# Patient Record
Sex: FEMALE | Race: WHITE
[De-identification: names, ages, dates, MRNs, and addresses within clinical notes are randomized per-mention and may not be internally consistent; named-entity substitution may affect disease eponyms.]

---

## 2017-09-06 ENCOUNTER — HOSPITAL ENCOUNTER (EMERGENCY)
Dept: HOSPITAL 25 - UCCORT | Age: 27
Discharge: HOME | End: 2017-09-06
Payer: COMMERCIAL

## 2017-09-06 VITALS — DIASTOLIC BLOOD PRESSURE: 78 MMHG | SYSTOLIC BLOOD PRESSURE: 138 MMHG

## 2017-09-06 DIAGNOSIS — J45.909: ICD-10-CM

## 2017-09-06 DIAGNOSIS — J06.9: Primary | ICD-10-CM

## 2017-09-06 DIAGNOSIS — Z88.0: ICD-10-CM

## 2017-09-06 PROCEDURE — G0463 HOSPITAL OUTPT CLINIC VISIT: HCPCS

## 2017-09-06 PROCEDURE — 99212 OFFICE O/P EST SF 10 MIN: CPT

## 2017-09-06 NOTE — UC
Respiratory Complaint HPI





- HPI Summary


HPI Summary: 





25 y/o female presents to the urgent care c/o productive cough since Vince 9/3/

2017. Pt states she is producing a mild white phlegm. She felt mild SOB and 

started using her Albuterol inhaler. She has also taking [id number] PO for 

her body aches, which has resolved today. Pt denies fever, wheezing, chest pain

, HA, nasal congestion, N/V/D.








- History of Current Complaint


Chief Complaint: UCRespiratory


Stated Complaint: COUGH/FEVER


Time Seen by Provider: 09/06/17 15:58


Hx Obtained From: Patient


Hx Last Menstrual Period: 8/28/17


Pregnant?: No


Onset/Duration: Gradual Onset, Lasting Days, Still Present


Timing: Constant


Severity Initially: Mild


Severity Currently: Moderate


Pain Intensity: 0


Pain Scale Used: 0-10 Numeric


Character: Cough: Productive - white phlegm


Aggravating Factors: Nothing


Alleviating Factors: Bronchodilator


Associated Signs And Symptoms: Positive: Dyspnea, Nasal Congestion.  Negative: 

Fever, Wheezing





- Risk Factors


Pulmonary Embolism Risk Factors: Negative


Cardiac Risk Factors: Negative


Pseudomonas Risk Factors: Negative


Tuberculosis Risk Factors: Negative





- Allergies/Home Medications


Allergies/Adverse Reactions: 


 Allergies











Allergy/AdvReac Type Severity Reaction Status Date / Time


 


Erythromycin Allergy Intermediate Hives Verified 09/06/17 15:47


 


Amoxicillin Allergy  Rash Verified 09/06/17 15:47


 


pecans Allergy  Swelling Uncoded 09/06/17 15:47





   Of  





   Face,Lips,&  





   Throat  














PMH/Surg Hx/FS Hx/Imm Hx


Previously Healthy: Yes


Respiratory History: Asthma





- Surgical History


Surgical History: Yes


Surgery Procedure, Year, and Place: C- section 5/1/16; wisdom teeth





- Family History


Known Family History: Positive: Cardiac Disease - BROTHER WPW, FATHER CARDIAC 

CONCERNS, Hypertension, Diabetes





- Social History


Occupation: Employed Full-time


Lives: With Family


Alcohol Use: None


Substance Use Type: None


Smoking Status (MU): Never Smoked Tobacco





Review of Systems


Constitutional: Negative, Other - body aches


Skin: Negative


Eyes: Negative


ENT: Nasal Discharge, Sinus Congestion


Respiratory: Shortness Of Breath - mild


Cardiovascular: Negative


Gastrointestinal: Negative


Genitourinary: Negative


Motor: Negative


Neurovascular: Negative


Musculoskeletal: Negative


Neurological: Negative


Psychological: Negative


Is Patient Immunocompromised?: No


All Other Systems Reviewed And Are Negative: Yes





Physical Exam


Triage Information Reviewed: Yes


Appearance: Well-Appearing, No Pain Distress, Well-Nourished, Obese


Vital Signs: 


 Initial Vital Signs











Temp  98.2 F   09/06/17 15:43


 


Pulse  65   09/06/17 15:43


 


Resp  16   09/06/17 15:43


 


BP  138/78   09/06/17 15:43


 


Pulse Ox  99   09/06/17 15:43











Vital Signs Reviewed: Yes


Eye Exam: Normal


Eyes: Positive: Conjunctiva Clear - PERRLA, EOMI


ENT: Positive: Normal ENT inspection, Hearing grossly normal, Pharyngeal 

erythema, Nasal congestion - edematous, erythematous nasal mucosa, Nasal 

drainage - clear.  Negative: Tonsillar swelling, Tonsillar exudate


Dental Exam: Normal


Neck exam: Normal


Neck: Positive: Supple, Nontender, No Lymphadenopathy


Respiratory Exam: Normal


Respiratory: Positive: Chest non-tender, Lungs clear, Normal breath sounds, No 

respiratory distress


Cardiovascular Exam: Normal


Cardiovascular: Positive: RRR, No Murmur, Pulses Normal


Abdominal Exam: Normal


Abdomen Description: Positive: Nontender, No Organomegaly, Soft.  Negative: CVA 

Tenderness (R), CVA Tenderness (L)


Bowel Sounds: Positive: Present


Musculoskeletal Exam: Normal


Musculoskeletal: Positive: Strength Intact, ROM Intact, No Edema


Neurological Exam: Normal


Psychological Exam: Normal


Skin Exam: Normal





UC Diagnostic Evaluation





- Laboratory


O2 Sat by Pulse Oximetry: 99





Respiratory Course/Dx





- Course


Course Of Treatment: 25 y/o female presents to the urgent care c/o productive 

cough since Vince 9/3/2017. Pt states she is producing a mild white phlegm. 

She felt mild SOB and started using her Albuterol inhaler. She has also taking 

[id number] PO for her body aches, which has resolved today. Pt denies fever

, wheezing, chest pain, HA, nasal congestion, N/V/D.Hx obtained, PE performed. 

Pt with the URI. Pt Tessalon PO to alleviate cough, advised to use the 

albuterol nebulizer at home if she continues with SOB. Advised to increase 

fluid intake, rest and eat well. If not improvement of symptoms to return to 

the urgent care or f/u with PCP for further management.





- Differential Dx/Diagnosis


Differential Diagnosis/HQI/PQRI: Asthma, Bronchitis, Influenza, Laryngitis, 

Lower Resp Infection, Sinusitis


Provider Diagnoses: 1- Upper respiratory infection





Discharge





- Discharge Plan


Condition: Stable


Disposition: HOME


Prescriptions: 


Albuterol 2.5MG/3ML (0.083%)* [Ventolin 2.5 MG/3 ML NEB.SOL*] 2.5 mg INH Q6H #1 

box


Benzonatate CAP* [Tessalon 100 MG CAP*] 100 mg PO TID #15 cap


Patient Education Materials:  Cold Symptoms (ED)


Referrals: 


CRISTÓBAL Araujo [Primary Care Provider] - 1 Week


Additional Instructions: 


1-Please continue taking ibuprofen PO q6-8hrs prn as instructed after meals to 

alleviate symptoms.


2- Take Tessalon Tabs PO to alleviate cough, increase fluid intake, rest ant 

est well. Use the Nebulizer in case you start feeling SOB.


3-If symptoms do not improve or worsen please return to the urgent care or f/u 

with your PCP for further evaluation and treatment.

## 2017-10-17 ENCOUNTER — HOSPITAL ENCOUNTER (EMERGENCY)
Dept: HOSPITAL 25 - UCCORT | Age: 27
Discharge: HOME | End: 2017-10-17
Payer: COMMERCIAL

## 2017-10-17 VITALS — SYSTOLIC BLOOD PRESSURE: 115 MMHG | DIASTOLIC BLOOD PRESSURE: 63 MMHG

## 2017-10-17 DIAGNOSIS — Z32.02: ICD-10-CM

## 2017-10-17 DIAGNOSIS — R14.0: ICD-10-CM

## 2017-10-17 DIAGNOSIS — R10.13: Primary | ICD-10-CM

## 2017-10-17 DIAGNOSIS — Z88.1: ICD-10-CM

## 2017-10-17 DIAGNOSIS — Z91.018: ICD-10-CM

## 2017-10-17 PROCEDURE — 84702 CHORIONIC GONADOTROPIN TEST: CPT

## 2017-10-17 PROCEDURE — G0463 HOSPITAL OUTPT CLINIC VISIT: HCPCS

## 2017-10-17 PROCEDURE — 81003 URINALYSIS AUTO W/O SCOPE: CPT

## 2017-10-17 PROCEDURE — 99212 OFFICE O/P EST SF 10 MIN: CPT

## 2017-10-17 NOTE — UC
Complaint Female HPI





- HPI Summary


HPI Summary: 





27 y/o female presents to the urgent care c/o feeling bloated and gassy with 

ache pain around his upper abdomen that radiates to his lower back for the past 

week. Pain is dull. 6/10. She also states frequency and pressure with 

urination. She has had nausea at times, espically when she is very bloated, but 

denies vomit, diarrhea or constipation. She had a normal BM this morning and 

she had a good breakfast this morning. She took Ibuprofen 200mg PO last night 

which relieve her symptoms. Pt denies fever, chest pain, SOB, burning on 

urination, vaginal discharge, Hx of STDs. LMP: 9/29/2017








- History Of Current Complaint


Chief Complaint: UCGeneralIllness


Stated Complaint: URINARY


Time Seen by Provider: 10/17/17 14:02


Hx Obtained From: Patient


Hx Last Menstrual Period: 9/29/17


Pregnant?: No


Onset/Duration: Gradual Onset, Lasting Weeks - 1 week, Still Present


Timing: Intermittent, Lasting Minutes


Severity Initially: Mild


Severity Currently: Moderate


Pain Intensity: 6


Pain Scale Used: 0-10 Numeric


Radiates to: lower back 


Character: Dull


Aggravating Factor(s): Urination


Alleviating Factor(s): Meds


Associated Signs And Symptoms: Positive: Back Pain, Nausea.  Negative: Fever, 

Vomiting(# Of Episodes =), Genital Swelling, Genital Blisters





- Risk Factors


Ectopic Pregnancy Risk Factor: Negative


Ovarian Torsion Risk Factor: Negative





- Allergies/Home Medications


Allergies/Adverse Reactions: 


 Allergies











Allergy/AdvReac Type Severity Reaction Status Date / Time


 


Erythromycin Allergy Intermediate Hives Verified 10/17/17 14:04


 


Amoxicillin Allergy  Rash Verified 10/17/17 14:04


 


pecans Allergy  Swelling Uncoded 10/17/17 14:04





   Of  





   Face,Lips,&  





   Throat  














PMH/Surg Hx/FS Hx/Imm Hx


Previously Healthy: Yes


Respiratory History: Asthma





- Surgical History


Surgical History: Yes


Surgery Procedure, Year, and Place: C- section 5/1/16; wisdom teeth





- Family History


Known Family History: Positive: Cardiac Disease - BROTHER WPW, FATHER CARDIAC 

CONCERNS, Hypertension, Diabetes, Other - NO fmh of mastitis





- Social History


Occupation: Employed Full-time


Lives: With Family


Alcohol Use: Occasionally


Substance Use Type: None


Smoking Status (MU): Never Smoked Tobacco





Review of Systems


Constitutional: Negative


Skin: Negative


Eyes: Negative


ENT: Negative


Respiratory: Negative


Cardiovascular: Negative


Gastrointestinal: Abdominal Pain - ache, dull , bloaded abdomen around upper 

abdomen with burping


Genitourinary: Frequency - and bladder pressure


Motor: Negative


Neurovascular: Negative


Musculoskeletal: Negative


Neurological: Negative


Psychological: Negative


Is Patient Immunocompromised?: No


All Other Systems Reviewed And Are Negative: Yes





Physical Exam


Triage Information Reviewed: Yes


Vital Signs: 


 Initial Vital Signs











Temp  98.9 F   10/17/17 14:06


 


Pulse  89   10/17/17 14:06


 


Resp  18   10/17/17 14:06


 


BP  115/63   10/17/17 14:06


 


Pulse Ox  99   10/17/17 14:06














- Additional Comments


Vital Signs Reviewed: Yes


General: well developed, well nourished female sitting in the examining table w/

o any apparent distress


Eyes: Positive: Conjunctiva Clear - PERRLA, EOMI, fundi grossly normal


ENT: Positive: Normal ENT inspection, Hearing grossly normal, Pharynx normal, 

TMs normal


Neck: Positive: Supple, Nontender, No Lymphadenopathy


Respiratory: Positive: Chest non-tender, Lungs clear, Normal breath sounds, No 

respiratory distress


Cardiovascular: Positive: RRR, No Murmur, Pulses Normal, Brisk Capillary Refill


Abdomen Description:  abdomen Flat with no distention. No surface trauma, scars

, incisions.  hyperactive bowel sounds  present in all four quadrants. Soft and

  non tenderness to palpation,  no guarding, no rigidity to palpation. No 

masses palpated, no pulsation in epigastric area.  No organomegaly.  Negative 

[place] signs.  No periumbilical tenderness.  No rebound in the lower 

quadrants.  NT over [place] point.  no suprapubic tenderness with  no 

distension.  Good femoral pulses bilaterally.  No hernia noted. No CVAT 

bilaterally


Bowel Sounds: Positive: Present, Hyperactive


Musculoskeletal: Positive: Strength Intact, ROM Intact, No Edema


Neurological Exam: Normal


Psychological Exam: Normal


Skin Exam: Normal











 Complaint Female Dx





- Course


Course Of Treatment: 27 y/o female presents to the urgent care c/o feeling 

bloated and gassy with ache pain around his upper abdomen that radiates to his 

lower back for the past week. Pain is dull. 6/10. She also states frequency and 

pressure with urination. She has had nausea at times, especially when she is 

very bloated, but denies vomit, diarrhea or constipation. She had a normal BM 

this morning and she had a good breakfast this morning. She took Ibuprofen 

200mg PO last night which relieve her symptoms. Pt denies fever, chest pain, SOB

, burning on urination, vaginal discharge, Hx of STDs. LMP: 9/29/2017. Hx 

obtained, UA ordered, Result: negative. PE : WNL. Pt retaining a lot of gas in 

the abdomen. Pt Rx Ibuprofen PO and Omeprazole PO. Advised on dietary 

modifications. Pt explained D/C intrsuctions. Pt understood and agreed with 

plan of care.





- Differential Dx/Diagnosis


Differential Diagnosis/HQI/PQRI: Cervicitis, Renal Colic, Ureteral Stone, 

Urinary Tract Infection


Provider Diagnoses: 1-Epigastric abdominl pain.  2-Gas and bloating





Discharge





- Discharge Plan


Condition: Stable


Disposition: HOME


Prescriptions: 


Ibuprofen TAB* [Motrin TAB* 800 MG] 800 mg PO Q6H #20 tab


Omeprazole CAP* [Prilosec CAP* 20 MG] 20 mg PO DAILY #30 cap.


Patient Education Materials:  Gas and Bloating (ED), Abdominal Pain (ED)


Referrals: 


CRISTÓBAL Araujo [Primary Care Provider] - 3 Days


Additional Instructions: 


1- Please take medications as directed to alleviate symptoms. Take Ibuprofen PO 

after meals. Drink hot water or hot tea to relieve gas.  Limiting dietary 

ingestion of known gas-producing foods such as cabbage, onions, broccoli, 

brussel sprouts, wheat, and potatoes can help reduce symptoms. Also avoid 

drinking carbonated beverages, and gulping food or liquids


2 - If symptoms do not improve or worsen in the next 2 days please go 

immediately to the ER or your PCP for further evaluation and treatment.

## 2018-06-05 ENCOUNTER — HOSPITAL ENCOUNTER (EMERGENCY)
Dept: HOSPITAL 25 - UCCORT | Age: 28
Discharge: HOME | End: 2018-06-05
Payer: COMMERCIAL

## 2018-06-05 VITALS — DIASTOLIC BLOOD PRESSURE: 68 MMHG | SYSTOLIC BLOOD PRESSURE: 134 MMHG

## 2018-06-05 DIAGNOSIS — Z88.1: ICD-10-CM

## 2018-06-05 DIAGNOSIS — J32.9: Primary | ICD-10-CM

## 2018-06-05 PROCEDURE — G0463 HOSPITAL OUTPT CLINIC VISIT: HCPCS

## 2018-06-05 PROCEDURE — 99212 OFFICE O/P EST SF 10 MIN: CPT

## 2018-06-05 NOTE — UC
Throat Pain/Nasal Kadeem HPI





- HPI Summary


HPI Summary: 





Pt c/o sore throat and generalized malaise X 2 weeks.  Pt states she was seen 

by PCP 1 week ago and had rapid strep test- negative, now states throat has 

improved but sinus pressure and pain has worsened. 





- History of Current Complaint


Chief Complaint: UCGeneralIllness


Stated Complaint: SORE THROAT, CONGESTION


Time Seen by Provider: 06/05/18 16:26


Hx Obtained From: Patient


Hx Last Menstrual Period: 5/7/18


Pregnant?: No


Onset/Duration: Gradual Onset, Lasting Weeks, Still Present, Worse Since - onset


Severity: Moderate


Pain Intensity: 6


Associated Signs & Symptoms: Positive: Dysphagia, Sinus Discomfort


Related History: Seasonal Allergies





- Epiglottits Risk Factors


Epiglottis Risk Factors: Negative





- Allergies/Home Medications


Allergies/Adverse Reactions: 


 Allergies











Allergy/AdvReac Type Severity Reaction Status Date / Time


 


amoxicillin Allergy  Rash Verified 06/05/18 16:17


 


erythromycin base Allergy  Hives Verified 06/05/18 16:17


 


pecans Allergy  Swelling Uncoded 06/05/18 16:17





   Of  





   Face,Lips,&  





   Throat  











Home Medications: 


 Home Medications





Dm/Pseudoephed/Acetaminophen [Day-Time Cold-Flu Softgel] 1 each PO Q6HR PRN 06/ 05/18 [History Confirmed 06/05/18]











PMH/Surg Hx/FS Hx/Imm Hx


Previously Healthy: Yes





- Surgical History


Surgical History: Yes


Surgery Procedure, Year, and Place: C- section 5/1/16; wisdom teeth





- Family History


Known Family History: Positive: Cardiac Disease - BROTHER WPW, FATHER CARDIAC 

CONCERNS, Hypertension, Diabetes, Other - NO fmh of mastitis





- Social History


Occupation: Employed Full-time


Lives: With Family


Alcohol Use: Occasionally


Substance Use Type: None


Smoking Status (MU): Never Smoked Tobacco


Have You Smoked in the Last Year: No





Review of Systems


Constitutional: Fatigue


Skin: Negative


Eyes: Negative


ENT: Sore Throat, Sinus Congestion, Sinus Pain/Tenderness


Respiratory: Negative


Cardiovascular: Negative


Gastrointestinal: Negative


Genitourinary: Negative


Motor: Negative


Neurovascular: Negative


Musculoskeletal: Negative


Neurological: Headache


Psychological: Negative


Is Patient Immunocompromised?: No


All Other Systems Reviewed And Are Negative: Yes





Physical Exam


Triage Information Reviewed: Yes


Appearance: Ill-Appearing


Vital Signs: 


 Initial Vital Signs











Temp  98.9 F   06/05/18 16:12


 


Pulse  86   06/05/18 16:12


 


Resp  16   06/05/18 16:12


 


BP  134/68   06/05/18 16:12


 


Pulse Ox  99   06/05/18 16:12











Vital Signs Reviewed: Yes


Eye Exam: Normal


ENT Exam: Other


ENT: Positive: Tonsillar swelling, Sinus tenderness


Dental Exam: Normal


Neck exam: Normal


Respiratory Exam: Normal


Cardiovascular Exam: Normal


Musculoskeletal Exam: Normal


Neurological Exam: Normal


Psychological Exam: Normal


Skin Exam: Normal





Throat Pain/Nasal Course/Dx





- Differential Dx/Diagnosis


Differential Diagnosis/HQI/PQRI: Pharyngitis, Sinusitis, Tonsillitis


Provider Diagnoses: sinusitis





Discharge





- Sign-Out/Discharge


Documenting (check all that apply): Discharge/Admit/Transfer





- Discharge Plan


Condition: Stable


Disposition: HOME


Prescriptions: 


DOXYcycline CAP(*) [DOXYcycline 100MG CAP(*)] 100 mg PO Q12H #20 cap


Patient Education Materials:  Sinusitis (ED), Tonsillitis (ED)


Referrals: 


CRISTÓBAL Araujo [Primary Care Provider] - 


Additional Instructions: 


Please follow up with your PCP or return to clinic as needed. 





- Billing Disposition and Condition


Condition: STABLE


Disposition: Home

## 2019-04-17 ENCOUNTER — HOSPITAL ENCOUNTER (INPATIENT)
Dept: HOSPITAL 25 - MCHOB | Age: 29
LOS: 3 days | Discharge: HOME | DRG: 540 | End: 2019-04-20
Attending: OBSTETRICS & GYNECOLOGY | Admitting: OBSTETRICS & GYNECOLOGY
Payer: COMMERCIAL

## 2019-04-17 DIAGNOSIS — Z3A.39: ICD-10-CM

## 2019-04-17 DIAGNOSIS — O34.211: Primary | ICD-10-CM

## 2019-04-17 DIAGNOSIS — Z88.1: ICD-10-CM

## 2019-04-17 DIAGNOSIS — Z30.2: ICD-10-CM

## 2019-04-17 PROCEDURE — 85025 COMPLETE CBC W/AUTO DIFF WBC: CPT

## 2019-04-17 PROCEDURE — 36415 COLL VENOUS BLD VENIPUNCTURE: CPT

## 2019-04-17 PROCEDURE — 0UB70ZZ EXCISION OF BILATERAL FALLOPIAN TUBES, OPEN APPROACH: ICD-10-PCS | Performed by: OBSTETRICS & GYNECOLOGY

## 2019-04-17 PROCEDURE — 4A1HXCZ MONITORING OF PRODUCTS OF CONCEPTION, CARDIAC RATE, EXTERNAL APPROACH: ICD-10-PCS | Performed by: OBSTETRICS & GYNECOLOGY

## 2019-04-17 PROCEDURE — 88302 TISSUE EXAM BY PATHOLOGIST: CPT

## 2019-04-17 RX ADMIN — ONDANSETRON PRN MG: 2 INJECTION INTRAMUSCULAR; INTRAVENOUS at 20:13

## 2019-04-17 RX ADMIN — ONDANSETRON PRN MG: 2 INJECTION INTRAMUSCULAR; INTRAVENOUS at 14:05

## 2019-04-17 RX ADMIN — KETOROLAC TROMETHAMINE SCH MG: 30 INJECTION, SOLUTION INTRAMUSCULAR; INTRAVENOUS at 18:24

## 2019-04-17 NOTE — XMS REPORT
Continuity of Care Document (CCD)

 Created on:2019



Patient:Megan Polk

Sex:Female

:1990

External Reference #:[id number]





Demographics







 Address  [address] [place] [address]

 

 Home Phone  [phone]

 

 Mobile Phone  [phone]

 

 Email Address  [email or web address]

 

 Preferred Language  en

 

 Marital Status  Not  or 

 

 Voodoo Affiliation  Unknown

 

 Race  White

 

 Ethnic Group  Not  or 









Author







 Name  Susana Roman









Support







 Name  Relationship  Address  Phone

 

 Yvon Hernandez    Unavailable  [phone]









Care Team Providers







 Name  Role  Phone

 

 Sumaya Hedrick MD  Primary Care Physician  Unavailable









Payers







 Date  Identification Numbers  Payment Provider  Subscriber

 

   Policy Number: [id number]  [facility]  Megan Hernandez









 PayID: [id number]  [address] [address]









 [place] [address]









   Policy Number: [id number]  [facility]  Megan Hernandez









 PayID: [id number]  [address]









 [place] [address]







Advance Directives







 Description

 

 No Information Available







Problems







 Description

 

 No Information







Family History







 Date  Family Member(s)  Observation  Comments

 

   Father   due to Unknown Causes  ()

 

   Mother  A&W  

 

   Children  3  

 

   First Son  A&W  

 

   First Daughter  A&W  

 

   Second Daughter  A&W  

 

   Siblings  2  

 

   First Brother  A&W  

 

   First Brother  born with heart problems  

 

   First Brother  born with white castano parkinsons  

 

   First Sister  A&W  

 

   Paternal Grandfather   due to heart problems  ()

 

   Paternal Grandmother  Skin Cancer  

 

   Maternal Grandfather  A&W  

 

   Maternal Grandmother  A&W  







Social History







 Type  Date  Description  Comments

 

 Birth Sex    Unknown  

 

 Marital Status      

 

 Lives With      

 

 Lives With    Daughters  

 

 Lives With    Son  

 

 Tobacco Use  Start: Unknown  Never Smoked Cigarettes  

 

 ETOH Use    Occasionally consumes alcohol  

 

 Recreational Drug Use    Denies Drug Use  

 

 Tobacco Use  Start: Unknown  Patient has never smoked  

 

 Smoking Status  Reviewed: 19  Patient has never smoked  

 

 Currently Active    Patient is currently sexually  



     active  

 

 STD's    No STD History  







Allergies, Adverse Reactions, Alerts







 Date  Description  Reaction  Status  Severity  Comments

 

 2018  Erythromycin  Hives  Active  Severe  

 

 2018  Amoxicillin  Rash  Active  Moderate  







Medications







 Medication  Date  Status  Form  Strength  Qnty  SIG  Indications  Ordering



                 Provider

 

 Advocate  03/20/  Active  Misc  33G X 4 mm  100uni  Use with    Lizz Eri



 Insulin Pen          ts  KwikPen    Mark



 Needles                , CNM

 

 Humulin N  /  Active  Supn  100Unit/ML  3ml  inject 2    Phaelon



 Kwikpen  2019          units SQ at    MD Marely



             bedtime,    



             increase as    



             directed    

 

 Blood Glucose  /  Active  Kit  W/Device  1Monit  use in in    Gasper A.



 Monitoring          or  the morning    Gelber,



 System            before    M.D.



             eating, then    



             2 hours    



             after every    



             meal    

 

 Blood Glucose  /  Active  Strips    100uni  use as    Gasper CARTER.



 Test Strips          ts  directed    Gelber,



 Premium                M.D.

 

 Lancets  /  Active  Misc    100uni  for use with    Gasper A.



   2019        ts  blood    Gelber,



             glucose    M.D.



             monitor. use    



             as directed    



             four times a    



             day    

 

 Ferrous  /  Active  Tablets  324(38Fe)  60tabs  1 by mouth    Gasper VALENZUELA



 Gluconate  2019      mg    every day    DARLIN Vargas

 

 Diclegis  10/01/  Active  Tablets DR  10-10mg  100tab  take 2 tabs    Mahrie



   2018        s  qhs. if    Appiah,



             symptoms    CNM



             persist can    



             add 1 tab    



             qam . if    



             symptoms    



             persist, add    



             1 tab every    



             afternoon.    



             MDD 4 tabs    

 

 Ondansetron  10/01/  Active  Tablets  4mg  30tabs  1-2 tablets    Erianna



 HCL  2018          every 6    Velazquez,



             hours as    CNM



             needed for    



             nausea    

 

 Flintstones  /  Active  Chewtabs  60mg    take 2 by    Unknown



 Complete  0000          mouth daily    

 

 Epipen 2-Nikos  /  Active  Solution  0.3mg/0.3M    bring to    Unknown



   0000    [employer]  L    office as    



       t      directed    

 

 Benadryl  /  Active  Tablets  25mg    prn    Unknown



 Allergy  0000              

 

                 

 

 Metformin HCL  /  Hx  Tablets  500mg  90tabs  take 1    Gasper ACalvin



    -          tablet once    Gelber,



   /          daily for 7    M.D.



             days, then    



             take 1    



             tablet 2    



             times a day    



             for 7 days,    



             then take 1    



             tablet 3    



             times a day.    

 

 Tamiflu  /  Hx  Capsules  75mg  10caps  1 by mouth    Mahrie



    -          daily x 10    Appiah,



   02/15/          days for flu    CNM



   2019          prophylaxis    

 

 Reglan  /  Hx  Tablets  5mg  60tabs  1-2 tabs by    Erianna



   2018 -          mouth every    Velazquez,



   /          6 hours as    CNM



   2019          needed    



             nausea    

 

 Unisom  /  Hx  Tablets  25mg        Unknown



   0000 -              



   2019              

 

 Vitamin B 6  /  Hx  Tablets  50mg        Unknown



    -              



   2019              







Immunizations







 Description

 

 No Information Available







Vital Signs







 Date  Vital  Result  Comment

 

 10/11/2018  9:27am  BP Systolic  130 mmHg  









 BP Diastolic  68 mmHg  

 

 Height  63 inches  5'3"

 

 Weight  194.00 lb  

 

 BMI (Body Mass Index)  34.4 kg/m2  

 

 Last Menstrual Period  [id number]  

 

   4  

 

 Parity  3  







Results







 Test  Date  Facility  Test  Result  H/L  Range  Note

 

 Laboratory test    [facility]  Genital For GRP  SEE RESULT
      1



 finding  [address]  [place] [address]  B Strep Only  BELOW      



     [phone]          

 

 Glucose Tolerance    [facility]  GTT 3HR  (SEE NOTE)      2



 3HR Gestational  [address]  [place] [address]  Gestational        



     [phone]          

 

 Laboratory test    [facility]  Glucose 1 HR  183 mg/dL  
High    3



 finding  [address]  [place] [address]  Post Prandial        



     [phone]          

 

 CBC With No Diff    [facility]  White Blood  10.2  N  3.5-
10.8  



   [address]  [place] [address]  Count  10^3/uL      



     [phone]          









 Red Blood Count  4.09 10^6/uL  N  4.00-5.40  

 

 Hemoglobin  10.7 g/dL  Low  12.0-16.0  

 

 Hematocrit  34 %  Low  35-47  

 

 Mean Corpuscular Volume  83 fL  N  80-97  

 

 Mean Corpuscular Hemoglobin  26 pg  Low  27-31  

 

 Mean Corpuscular HGB Conc  32 g/dL  N  31-36  

 

 Red Cell Distribution Width  15 %  N  10.5-15  

 

 Platelet Count  315 10^3/uL  N  150-450  

 

 Mean Platelet Volume  7.7 fL  N  7.4-10.4  









 Laboratory test  2019  [facility]  Rubella Screen  Equivocal 
   Immune  4



 [place] [address]          



     [phone]          

 

 Urine Drug Comp  2018  [facility]  Urine  Negative      5



 20 [place] [address]  Amphetamine  ng/mL      



     [phone]          









 Urine Barbiturates  Negative ng/mL      6

 

 Urine Benzodiazepines  Negative ng/mL      7

 

 Urine Cocaine  Negative ng/mL      8

 

 Urine Phencyclidine  Negative ng/mL    Cutoff: 25  

 

 Urine Tetrahydrocannabinol  Negative ng/mL    Cutoff: 50  9

 

 Creatinine, Urine  141.5 mg/dL      

 

 Specific Gravity  1.014      

 

 pH  7.1      

 

 Oxidants  Negative      10

 

 Adulterants Comment  Normal      

 

 Codeine, Ur  Not Detected ng/mL    Cutoff: 25  11

 

 Codeine-6-beta-glucuronide, Ur  Not Detected ng/mL      12

 

 Morphine, Ur  Not Detected ng/mL    Cutoff: 25  13

 

 Morphine-6-beta-glucuronide, U  Not Detected ng/mL      14

 

 6-monoacetylmorphine, Ur  Not Detected ng/mL    Cutoff: 25  15

 

 Hydrocodone, Ur  Not Detected ng/mL    Cutoff: 25  16

 

 Norhydrocodone, Ur  Not Detected ng/mL    Cutoff: 25  17

 

 Dihydrocodeine, Ur  Not Detected ng/mL    Cutoff: 25  18

 

 Hydromorphone, Ur  Not Detected ng/mL    Cutoff: 25  19

 

 Ljazlczcfctol6lluzmfdkpfnvkqj  Not Detected ng/mL      20

 

 Oxycodone, Ur  Not Detected ng/mL    Cutoff: 25  21

 

 Noroxycodone, Ur  Not Detected ng/mL    Cutoff: 25  22

 

 Oxymorphone, Ur  Not Detected ng/mL    Cutoff: 25  23

 

 Oxymorphone-3-beta-glucuronide  Not Detected ng/mL      24

 

 Noroxymorphone, Ur  Not Detected ng/mL    Cutoff: 25  25

 

 Fentanyl, Ur  Not Detected ng/mL    Cutoff: 2  26

 

 Norfentanyl, Ur  Not Detected ng/mL    Cutoff: 2  27

 

 Meperidine, Ur  Not Detected ng/mL    Cutoff: 25  28

 

 Normeperidine, Ur  Not Detected ng/mL    Cutoff: 25  29

 

 Naloxone, Ur  Not Detected ng/mL    Cutoff: 25  30

 

 Naloxone-3-beta-glucuronide, U  Not Detected ng/mL      31

 

 Methadone, Ur  Not Detected ng/mL    Cutoff: 25  32

 

 Eddp, Ur  Not Detected ng/mL    Cutoff: 25  33

 

 Propoxyphene, Ur  Not Detected ng/mL    Cutoff: 25  34

 

 Norpropoxyphene, Ur  Not Detected ng/mL    Cutoff: 25  35

 

 Tramadol, Ur  Not Detected ng/mL    Cutoff: 25  36

 

 O-desmethyltramadol, Ur  Not Detected ng/mL    Cutoff: 25  37

 

 Tapentadol, Ur  Not Detected ng/mL    Cutoff: 25  38

 

 N-desmethyltapentadol, Ur  Not Detected ng/mL    Cutoff: 50  39

 

 Tapentadol-beta-glucuronide, U  Not Detected ng/mL      40

 

 Buprenorphine, Ur  Not Detected ng/mL    Cutoff: 5  41

 

 Norbuprenorphine, Ur  Not Detected ng/mL    Cutoff: 5  42

 

 Norbuprenorphine glucuronide  Not Detected ng/mL    Cutoff: 20  43

 

 Opioid Interpretation  See Comment      44









 GC/Chlamydia Dna  2018  [facility]  Chlamydia  Negative    
Negative  



 [place] [address]  trachomatis Rna        



     [phone]          









 Neisseria gonorrhoeae (GC) Rna  Negative    Negative  









 Urine Culture And  10/11/2018  [facility]  Urine Culture  SEE 
RESULT      45



 [place] [address]    BELOW      



     [phone]          

 

 Toxoplasma Igg &  10/11/2018  [facility]  Toxoplasma IgG  Negative 
   Negative  



 [place] [address]  Antibody        



     [phone]          









 Toxoplasma IgG Antibody Index  <3 IU/mL      46

 

 Toxoplasma IgM Antibody  Negative    Negative  47









 Parvovirus B19  10/11/2018  [facility]  Parvovirus  Positive  
Abnormal  Negative  



 [place]gg [place] [address]  (B19) IgG        



     [phone]  Antibody        









 Parvovirus (B19) IgM Antibody  Negative    Negative  

 

 Parvovirus Interpretation  See Comment      48









 Lead  10/11/2018  [facility]  Lead,Venous, B  < 1.0 g/dL    0.0-
4.9  49



     [place], [place] [address] [phone]          









 Venous/Capillary  Venous      

 

 Submitting Laboratory Phone  [phone]      50









 HIV 1/2 AB  10/11/2018  [facility]  HIV 1 2  Nonreactive    
Nonreactive  51



 [place] [address]  Antibody        



     [phone]          

 

 Type And  10/11/2018  [facility]  Patient  A Positive      



 [place] [address]  Blood Type        



     [phone]          









 Antibody Screen  NEGATIVE      









 CBC With No  10/11/2018  [facility]  White Blood  7.6 10^3/uL  N  
3.5-10.8  



 [place] [address]  Count        



     [phone]          









 Red Blood Count  4.37 10^6/uL  N  4.00-5.40  

 

 Hemoglobin  12.8 g/dL  N  12.0-16.0  

 

 Hematocrit  38 %  N  35-47  

 

 Mean Corpuscular Volume  88 fL  N  80-97  

 

 Mean Corpuscular Hemoglobin  29 pg  N  27-31  

 

 Mean Corpuscular HGB Conc  33 g/dL  N  31-36  

 

 Red Cell Distribution Width  14 %  N  10.5-15  

 

 Platelet Count  281 10^3/uL  N  150-450  

 

 Mean Platelet Volume  7.7 um3  N  7.4-10.4  









 Prenatal PNL No  10/11/2018  [facility]  Rubella Screen  Equivocal 
   Immune  52



 [place] [address]          



     [phone]          









 Hemoglobin A1c  5.3 %  N  4.0-5.6  53

 

 Hepatitis B Surface Ag  Nonreactive    Nonreactive  54

 

 Syphillis Igg W/Reflex RPR  Nonreactive    Nonreactive  55









 Laboratory test  10/11/2018  [facility]  Cytology  SEE RESULT BELOW
      56



 [place] [address]          



     [phone]          









 1  SEE RESULT BELOW



   -----------------------------------------------------------------------------
---------------



   Name:  MEGAN POLK           : 1990    Attend Dr: Latoya Harris MD



   Acct:  [id number]  Unit: [id number]  AGE: 28            Location:  [facility]



   Re19                        SEX: F             Status:    REG REF



   -----------------------------------------------------------------------------
---------------



   



   SPEC: [id number]         EDNA:       SUBM DR: Latoya Harris MD



   REQ:  [id number]              RECD:   



   STATUS: COMP



   _



   SOURCE: CER/VAG/RE     SPDESC:



   ORDERED:  Grp B Strp Scrn, GBS Sensi



   COMMENTS: Pt with allergies to Amoxicillin and Erythromycin



   [id number]



   QUERIES:  Is Patient Penicillin Allergic? Y



   Is patient penicillin allergic and/or sensitivities needed? Y



   Provider Requisition # [id number]



   



   -----------------------------------------------------------------------------
---------------



   Procedure                         Result                         Reported   
        Site



   -----------------------------------------------------------------------------
---------------



   Group B Strep Culture Screen  Final                              846      ML



   



   Group B Strep Screen        Positive



   



   GBS Sensitivity  Final                                           846      ML



   Organism 1                     STREP AGALACTIAE - (GROUP B)



   



   1. STREP AGALACTIAE - (GROUP B)



   M.I.C.      RX



   --------- ------



   Ampicillin                     <=0.25      S



   Penicillin                     <=0.12      S



   Clindamycin                                R



   Levofloxacin                   1           S



   Linezolid                      2           S



   * Moxifloxacin                   0.5         S



   * Quinupristin/Dalfopristin      <=0.25      S



   Tetracycline                   >=16        R



   Tigecycline                    <=0.12      S



   Vancomycin                     <=0.5       S



   Imipenem-Deduced                           S



   * Ampicillin/Sulbactam-Deduced               S



   Cefazolin-Deduced                          S



   



   



   



   



   



   ** CONTINUED ON NEXT PAGE **



   



   DEPARTMENT OF PATHOLOGY,  [address] [place] [address]



   Phone # [phone]      Fax #[phone]



   Conrado Cabrera M.D. Director     [facility] # [id number]



   



   



   



   -----------------------------------------------------------------------------
---------------



   Patient: MEGAN POLK            [id number]     (Continued)



   -----------------------------------------------------------------------------
---------------



   



   



   Specimen: [id number]    Collected:   Received: 19-
    (Continued)



   



   -----------------------------------------------------------------------------
---------------



   Procedure                         Result                         Reported   
        Site



   -----------------------------------------------------------------------------
---------------



   GBS Sensitivity  Final   (continued)



   * These antibiotics are not available in the [facility] Formulary



   



   Contact the Microbiology Department for any additional



   antibiotic reporting.



   



   -----------------------------------------------------------------------------
---------------



   * ML - Main Lab



   .



   



   



   



   



   



   



   



   



   



   



   



   



   



   



   



   



   



   



   



   



   



   



   



   



   



   



   



   



   



   ** END OF REPORT **



   



   DEPARTMENT OF PATHOLOGY,  [address] [place] [address]



   Phone # [phone]      Fax #[phone]



   Conrado Cabrera M.D. Director     [facility] # [id number]

 

 2  GLU Fast 92                   Col: [phone]



   GLU 1HR  201                  Col: 19 1155



   GLU 2HR  181                  Col: 19 [phone]



   GLU 3HR  103                  Col: 19 1355



   GLU Interp                      Col: 19 1055



   GTT normal ranges for obstetrics per the American College of



   Gynecologists (ACOG).Based on 100 gm glucose load:



   Fasting <95 mg/dl



   1hr     <180 mg/dl



   2hr     <155 mg/dl



   3hr     <140 mg/dl

 

 3  [id number]

 

 4  [id number]

 

 5  -------------------REFERENCE VALUE--------------------------



   Cutoff: 500

 

 6  -------------------REFERENCE VALUE--------------------------



   Cutoff: 200

 

 7  -------------------REFERENCE VALUE--------------------------



   Cutoff: 100

 

 8  -------------------REFERENCE VALUE--------------------------



   Cutoff: 150

 

 9  -------------------ADDITIONAL INFORMATION-------------------



   This report is intended for use in clinical monitoring or



   management of patients.  It is not intended for use in



   employment-related testing.

 

 10  -------------------REFERENCE VALUE--------------------------



   Cutoff: 200 mg/L

 

 11  Tylenol 3

 

 12  Metabolite of codeine



   -------------------REFERENCE VALUE--------------------------



   Cutoff: 100

 

 13  Sherlyn Mckeon, MS Contin; Also a minor metabolite (10%) of



   codeine and can be seen in low concentrations (<2,000



   ng/mL) with poppy seed ingestion.

 

 14  Metabolite of morphine



   -------------------REFERENCE VALUE--------------------------



   Cutoff: 100

 

 15  Metabolite of heroin

 

 16  Lortab, Norco, Vicodin; Also a very minor metabolite of



   codeine and impurity (<1%) of oxycodone.

 

 17  Metabolite of hydrocodone

 

 18  Metabolite of hydrocodone

 

 19  Dilaudid, Exalgo; Also a metabolite of hydrocodone and a



   minor (<5%) metabolite of morphine.

 

 20  Metabolite of hydromorphone



   -------------------REFERENCE VALUE--------------------------



   Cutoff: 100

 

 21  Endocet, Percocet, Oxycontin

 

 22  Metabolite of oxycodone

 

 23  Numorphan, Opana; Also a metabolite of oxycodone.

 

 24  Metabolite of oxymorphone



   -------------------REFERENCE VALUE--------------------------



   Cutoff: 100

 

 25  Metabolite of oxymorphone

 

 26  Actiq, Duragesic, Fentora

 

 27  Metabolite of fentanyl

 

 28  Demerol

 

 29  Metabolite of meperidine

 

 30  Narcan

 

 31  Metabolite of naloxone



   -------------------REFERENCE VALUE--------------------------



   Cutoff: 100

 

 32  Dolophine

 

 33  Metabolite of methadone

 

 34  Darvon, Darvocet

 

 35  Metabolite of propoxyphene

 

 36  Tradol, Ultram, Ultracet

 

 37  Metabolite of tramadol

 

 38  Nucynta

 

 39  Metabolite of tapentadol

 

 40  Metabolite of tapentadol



   -------------------REFERENCE VALUE--------------------------



   Cutoff: 100

 

 41  Buprenex, Suboxone

 

 42  Metabolite of buprenorphine

 

 43  Metabolite of buprenorphine

 

 44  No opioids were detected. The absence of expected drug(s)



   and/or drug metabolite(s) may indicate non-compliance,



   altered pharmacokinetics, inappropriate timing of specimen



   collection relative to drug administration,



   diluted/adulterated urine, or limitations of testing.



   -------------------ADDITIONAL INFORMATION-------------------



   This test was developed and its performance characteristics



   determined by [facility] in a manner consistent with CLIA



   requirements. This test has not been cleared or approved by



   the U.S. Food and Drug Administration.



   Test Performed by:



   [facility]



   [address] [place] [address]

 

 45  SEE RESULT BELOW



   -----------------------------------------------------------------------------
---------------



   Name:  MEGAN POLK           : 1990    Attend Dr: Pattie BECKFORD



   Acct:  [id number]  Unit: [id number]  AGE: 27            Location:  [facility]



   Reg:   10/11/18                        SEX: F             Status:    REG REF



   -----------------------------------------------------------------------------
---------------



   



   SPEC: [id number]         EDNA:   10/11/    [facility] DR: Pattie BECKFORD



   REQ:  [id number]              RECD:   10/11/



   STATUS: COMP



   _



   SOURCE: URINE          SPDESC:



   ORDERED:  Urine Culture



   COMMENTS: [id number]



   Urine Source: Random



   



   -----------------------------------------------------------------------------
---------------



   Procedure                         Result                         Reported   
        Site



   -----------------------------------------------------------------------------
---------------



   Urine Culture  Final                                             10/12/18-
1333      ML



   No Growth (<1,000 CFU/mL)



   



   -----------------------------------------------------------------------------
---------------



   * ML - Main Lab



   .



   



   



   



   



   



   



   



   



   



   



   



   



   



   



   



   



   



   



   



   



   



   



   



   



   ** END OF REPORT **



   



   DEPARTMENT OF PATHOLOGY,  [address] [place] [address]



   Phone # [phone]      Fax #[phone]



   Conrado Cabrera M.D. Director     [facility] # [id number]

 

 46  -------------------REFERENCE VALUE--------------------------



   <=9 IU/mL (Negative)



   10-11 IU/mL (Equivocal)



   >=12 IU/mL (Positive)



   Test Performed by:



   [facility] [place] [address]

 

 47  No IgM antibodies to T. gondii detected. Results may be



   negative in patients with recent infection or who are



   significantly immunosuppressed.

 

 48  RESULT: Results suggest past infection.



   -------------------ADDITIONAL INFORMATION-------------------



   This test has been modified from the 's



   instructions. Its performance characteristics were



   determined by [facility] in a manner consistent with



   CLIA requirements. This test has not been cleared or



   approved by the U.S. Food and Drug Administration.



   Test Performed by:



   [facility] [place] [address]

 

 49  -------------------ADDITIONAL INFORMATION-------------------



   Testing performed by Inductively Coupled Plasma-Mass



   Spectrometry (ICP-MS).



   This test was developed and its performance characteristics



   determined by [facility] in a manner consistent with CLIA



   requirements. This test has not been cleared or approved by



   the U.S. Food and Drug Administration.

 

 50  Test Performed by:



   [facility] [place] [address]

 

 51  It is recognized that currently available assays for the



   detection of antibodies to HIV-1 and/or HIV-2 may not detect



   all infected individuals. HIV antibodies may be undetectable



   in some stages of the infection and in some clinical



   conditions. The performance of this assay has not been



   established for populations of infants or children.



   



   Assayed by Chemiluminescence Microparticle Immunoassay on



   the Siemens Advia Centaur CP. Values obtained with different



   methods or kits cannot be used interchangeably.The



   diagnostic specificity of the ADVIA Centaur 1/O/2 Enhanced



   assay in the low risk population was 99.90% (6052/6058) with



   a 95% confidence interval of 99.78 to 99.96%.

 

 52  [id number]

 

 53  Therapeutic target for the treatment of diabetes



   mellitus patients is <7% HBA1C, and in selective



   patients <6.0%.  Please refer to American Diabetes



   Association diabetic care guidelines for further



   information.

 

 54  [id number]

 

 55  Warning:  A positive result is not useful for establishing a



   diagnosis of syphilis.  In most situations, such a result



   may reflect a prior treated infection; a negative result can



   exclude a diagnosis of syphilis except for incubating or



   early primary disease.

 

 56  SEE RESULT BELOW



   -----------------------------------------------------------------------------
---------------



   Name:  JALIL JHARIGHTMEGAN           : 1990    Attend Dr: Pattie BECKFORD



   Acct:  [id number]  Unit: [id number]  AGE: 27            Location:  [facility]



   Reg:   10/11/18                        SEX: F             Status:    REG REF



   -----------------------------------------------------------------------------
---------------



   



   SPEC: [id number]            EDNA: 10/11/      SUBM DR: Pattie BECKFORD



   REQ:  [id number]             RECD: 10/11/



   STATUS: SOUT



   _



   ORDERED:  TP IMAGE ANALYS, HPV/Thin Prep



   COMMENTS: [id number]



   Negative for Intraepithelial lesion or Malignancy



   



   -----------------------------------------------------------------------------
---------------



   Date     Time Test              Result   Flag (u) Normal Range



   10/11/18 0277 @ HPV RNA         Negative          Negative



   @



   @               The high-risk HPV types detected by the assay include: 16,



   @               18, 31, 33, 35, 39, 45, 51, 52, 56, 58, 59, 66, and 68.



   -----------------------------------------------------------------------------
---------------



   



   A. Ectocervical/Endocervical



   Specimen Adequacy:



   Satisfactory of evaluation



   Transformation zone component identified



   Patient Information:



   HPV: High risk HPV RNA testing regardless of pap results.



   Actual Specimen Date:         10/11/18



   Last Menstrual Date:          18



   Spec Date if unknown:       



   Pregnant?:     Y



   



   Signed by and Reported on: __________              NORI Billy(ASCP) 10
/12/18 1517



   



   This Pap test was evaluated with the assistance of the DormNoisep Test Imaging 
System. Due to



   cytologic findings at the imager microscope, comprehensive manual 
rescreening by a



   Cytotechnologist may be required.



   The Pap Smear is a screening test designed to aid in the detection of 
premalignant and



   malignant conditions of the uterine cervix.  It is not a diagnostic 
procedure and should



   not be used as the sole means of detecting cervical cancer.  Both false-
positive and false-



   negative reports do occur.  Depending on your risk status, a Pap smear 
should be obtained



   and evaluated every 1-3 years.



   



   -----------------------------------------------------------------------------
---------------



   



   



   



   



   ** END OF REPORT **



   



   DEPARTMENT OF PATHOLOGY,  [address] [place] [address]



   Phone # [phone]      Fax #[phone]



   Conrado Cabrera M.D. Director     [facility] # [id number]







Procedures







 Date  Code  Description  Status

 

 2019  22337  Echography Pregnant Uterus Limited  Completed

 

 2019  94704  Echography Pregnant Uterus Limited  Completed

 

 2019  87886  Fetal Non-Stress Test  Completed

 

 2019  30853  Biophysical Profile Without Non Stress Test  Completed

 

 2019  39055  Echography Pregnant Uterus Follow-Up Or Repeat  Completed

 

 2019  88150  Fetal Non-Stress Test  Completed

 

 2018  18489  Echography Pregnant Uterus Complete  Completed

 

 10/11/2018  00215  OB Ultrasound First Trimester  Completed







Encounters







 Description

 

 No Information Available







Plan of Treatment

Future Appointment(s):2019  1:00 pm - Latoya Harris MD at [facility] 10:20 am - Barbie Mims CNM at [facility]2019  9:30 am - 
Latoya Harris MD at [facility]

## 2019-04-17 NOTE — XMS REPORT
Continuity of Care Document (CCD)

 Created on:2019



Patient:Megan Polk

Sex:Female

:1990

External Reference #:[id number]





Demographics







 Address  [address] [place] [address]

 

 Home Phone  [phone]

 

 Mobile Phone  [phone]

 

 Email Address  [email or web address]

 

 Preferred Language  en

 

 Marital Status  Not  or 

 

 Quaker Affiliation  Unknown

 

 Race  White

 

 Ethnic Group  Not  or 









Author







 Name  Latoya Harris MD

 

 Address  [address]



   Unavailable



   [place] [address]









Support







 Name  Relationship  Address  Phone

 

 Yvon Hernandez    Unavailable  [phone]









Care Team Providers







 Name  Role  Phone

 

 Sumaya Hedrick MD  Primary Care Physician  Unavailable









Payers







 Date  Identification Numbers  Payment Provider  Subscriber

 

   Policy Number: [id number]  [facility]  Megan Hernandez









 PayID: [id number]  [address] [address]









 [place] [address]









   Policy Number: [id number]  [facility]  Megan Hernandez









 PayID: [id number]  [address]









 [place] [address]







Advance Directives







 Description

 

 No Information Available







Problems







 Description

 

 No Information







Family History







 Date  Family Member(s)  Observation  Comments

 

   Father   due to Unknown Causes  ()

 

   Mother  A&W  

 

   Children  3  

 

   First Son  A&W  

 

   First Daughter  A&W  

 

   Second Daughter  A&W  

 

   Siblings  2  

 

   First Brother  A&W  

 

   First Brother  born with heart problems  

 

   First Brother  born with white castano parkinsons  

 

   First Sister  A&W  

 

   Paternal Grandfather   due to heart problems  ()

 

   Paternal Grandmother  Skin Cancer  

 

   Maternal Grandfather  A&W  

 

   Maternal Grandmother  A&W  







Social History







 Type  Date  Description  Comments

 

 Birth Sex    Unknown  

 

 Marital Status      

 

 Lives With      

 

 Lives With    Daughters  

 

 Lives With    Son  

 

 Tobacco Use  Start: Unknown  Never Smoked Cigarettes  

 

 ETOH Use    Occasionally consumes alcohol  

 

 Recreational Drug Use    Denies Drug Use  

 

 Currently Active    Patient is currently sexually active  

 

 STD's    No STD History  







Allergies, Adverse Reactions, Alerts







 Date  Description  Reaction  Status  Severity  Comments

 

 2018  Erythromycin  Hives  Active  Severe  

 

 2018  Amoxicillin  Rash  Active  Moderate  







Medications







 Medication  Date  Status  Form  Strength  Qnty  SIG  Indications  Ordering



                 Provider

 

 Advocate  /  Active  Misc  33G X 4 mm  100uni  Use with    Lizz Hwang



 Insulin Pen          ts  KwikPen    Mark



 Needles                , CNM

 

 Humulin N  /  Active  Supn  100Unit/ML  3ml  inject 2    Phaelon



 Kwikpen  2019          units SQ at    MD Marely



             bedtime,    



             increase as    



             directed    

 

 Blood Glucose  /  Active  Kit  W/Device  1Monit  use in in    Gasper ACalvin



 Monitoring          or  the morning    Gelber,



 System            before    M.D.



             eating, then    



             2 hours    



             after every    



             meal    

 

 Blood Glucose  /  Active  Strips    100uni  use as    Gasper VALENZUELA



 Test Strips          ts  directed    Gelber,



 Premium                M.D.

 

 Lancets  /  Active  Misc    100uni  for use with    Gasper A.



   2019        ts  blood    Gelber,



             glucose    M.D.



             monitor. use    



             as directed    



             four times a    



             day    

 

 Ferrous  /  Active  Tablets  324(38Fe)  60tabs  1 by mouth    Gasper A.



 Gluconate  2019      mg    every day    GelDARLIN bryan

 

 Diclegis  10/01/  Active  Tablets DR  10-10mg  100tab  take 2 tabs    Mahrie



   2018        s  qhs. if    Appiah,



             symptoms    CNM



             persist can    



             add 1 tab    



             qam . if    



             symptoms    



             persist, add    



             1 tab every    



             afternoon.    



             MDD 4 tabs    

 

 Ondansetron  10/01/  Active  Tablets  4mg  30tabs  1-2 tablets    Erianna



 HCL  2018          every 6    Velazquez,



             hours as    CNM



             needed for    



             nausea    

 

 Flintstones  /  Active  Chewtabs  60mg    take 2 by    Unknown



 Complete  0000          mouth daily    

 

 Epipen 2-Nikos  /  Active  Solution  0.3mg/0.3M    bring to    Unknown



   0000    [employer]  L    office as    



       t      directed    

 

 Benadryl  /  Active  Tablets  25mg    prn    Unknown



 Allergy  0000              

 

                 

 

 Metformin HCL  /  Hx  Tablets  500mg  90tabs  take 1    Gasper A.



   2019 -          tablet once    Gelber,



   /          daily for 7    M.D.



   2019          days, then    



             take 1    



             tablet 2    



             times a day    



             for 7 days,    



             then take 1    



             tablet 3    



             times a day.    

 

 Tamiflu  /  Hx  Capsules  75mg  10caps  1 by mouth    Mahrie



   2019 -          daily x 10    Appiah,



   02/15/          days for flu    CNM



   2019          prophylaxis    

 

 Reglan  /  Hx  Tablets  5mg  60tabs  1-2 tabs by    Erianna



   2018 -          mouth every    Velazquez,



             6 hours as    CNM



   2019          needed    



             nausea    

 

 Unisom  /  Hx  Tablets  25mg        Unknown



   0000 -              



   2019              

 

 Vitamin B 6  /  Hx  Tablets  50mg        Unknown



   0000 -              



   2019              







Immunizations







 Description

 

 No Information Available







Vital Signs







 Date  Vital  Result  Comment

 

 10/11/2018  9:27am  BP Systolic  130 mmHg  









 BP Diastolic  68 mmHg  

 

 Height  63 inches  5'3"

 

 Weight  194.00 lb  

 

 BMI (Body Mass Index)  34.4 kg/m2  

 

 Last Menstrual Period  [id number]  

 

   4  

 

 Parity  3  







Results







 Test  Date  Facility  Test  Result  H/L  Range  Note

 

 Laboratory test    [facility]  Genital For GRP  SEE RESULT
      1



 finding  [address]  [place] [address]  B Strep Only  BELOW      



     [phone]          

 

 Glucose Tolerance    [facility]  GTT 3HR  (SEE NOTE)      2



 3HR Gestational  [address]  [place] [address]  Gestational        



     [phone]          

 

 Laboratory test    [facility]  Glucose 1 HR  183 mg/dL  
High    3



 finding  [address]  [place] [address]  Post Prandial        



     [phone]          

 

 CBC With No Diff    [facility]  White Blood  10.2  N  3.5-
10.8  



   [address]  [place] [address]  Count  10^3/uL      



     [phone]          









 Red Blood Count  4.09 10^6/uL  N  4.00-5.40  

 

 Hemoglobin  10.7 g/dL  Low  12.0-16.0  

 

 Hematocrit  34 %  Low  35-47  

 

 Mean Corpuscular Volume  83 fL  N  80-97  

 

 Mean Corpuscular Hemoglobin  26 pg  Low  27-31  

 

 Mean Corpuscular HGB Conc  32 g/dL  N  31-36  

 

 Red Cell Distribution Width  15 %  N  10.5-15  

 

 Platelet Count  315 10^3/uL  N  150-450  

 

 Mean Platelet Volume  7.7 fL  N  7.4-10.4  









 Laboratory test  2019  [facility]  Rubella Screen  Equivocal 
   Immune  4



 [place] [address]          



     [phone]          

 

 Urine Drug Comp  2018  [facility]  Urine  Negative      5



 20 [place] [address]  Amphetamine  ng/mL      



     [phone]          









 Urine Barbiturates  Negative ng/mL      6

 

 Urine Benzodiazepines  Negative ng/mL      7

 

 Urine Cocaine  Negative ng/mL      8

 

 Urine Phencyclidine  Negative ng/mL    Cutoff: 25  

 

 Urine Tetrahydrocannabinol  Negative ng/mL    Cutoff: 50  9

 

 Creatinine, Urine  141.5 mg/dL      

 

 Specific Gravity  1.014      

 

 pH  7.1      

 

 Oxidants  Negative      10

 

 Adulterants Comment  Normal      

 

 Codeine, Ur  Not Detected ng/mL    Cutoff: 25  11

 

 Codeine-6-beta-glucuronide, Ur  Not Detected ng/mL      12

 

 Morphine, Ur  Not Detected ng/mL    Cutoff: 25  13

 

 Morphine-6-beta-glucuronide, U  Not Detected ng/mL      14

 

 6-monoacetylmorphine, Ur  Not Detected ng/mL    Cutoff: 25  15

 

 Hydrocodone, Ur  Not Detected ng/mL    Cutoff: 25  16

 

 Norhydrocodone, Ur  Not Detected ng/mL    Cutoff: 25  17

 

 Dihydrocodeine, Ur  Not Detected ng/mL    Cutoff: 25  18

 

 Hydromorphone, Ur  Not Detected ng/mL    Cutoff: 25  19

 

 Twxfxesrintnk6wlrqfwwgirokyla  Not Detected ng/mL      20

 

 Oxycodone, Ur  Not Detected ng/mL    Cutoff: 25  21

 

 Noroxycodone, Ur  Not Detected ng/mL    Cutoff: 25  22

 

 Oxymorphone, Ur  Not Detected ng/mL    Cutoff: 25  23

 

 Oxymorphone-3-beta-glucuronide  Not Detected ng/mL      24

 

 Noroxymorphone, Ur  Not Detected ng/mL    Cutoff: 25  25

 

 Fentanyl, Ur  Not Detected ng/mL    Cutoff: 2  26

 

 Norfentanyl, Ur  Not Detected ng/mL    Cutoff: 2  27

 

 Meperidine, Ur  Not Detected ng/mL    Cutoff: 25  28

 

 Normeperidine, Ur  Not Detected ng/mL    Cutoff: 25  29

 

 Naloxone, Ur  Not Detected ng/mL    Cutoff: 25  30

 

 Naloxone-3-beta-glucuronide, U  Not Detected ng/mL      31

 

 Methadone, Ur  Not Detected ng/mL    Cutoff: 25  32

 

 Eddp, Ur  Not Detected ng/mL    Cutoff: 25  33

 

 Propoxyphene, Ur  Not Detected ng/mL    Cutoff: 25  34

 

 Norpropoxyphene, Ur  Not Detected ng/mL    Cutoff: 25  35

 

 Tramadol, Ur  Not Detected ng/mL    Cutoff: 25  36

 

 O-desmethyltramadol, Ur  Not Detected ng/mL    Cutoff: 25  37

 

 Tapentadol, Ur  Not Detected ng/mL    Cutoff: 25  38

 

 N-desmethyltapentadol, Ur  Not Detected ng/mL    Cutoff: 50  39

 

 Tapentadol-beta-glucuronide, U  Not Detected ng/mL      40

 

 Buprenorphine, Ur  Not Detected ng/mL    Cutoff: 5  41

 

 Norbuprenorphine, Ur  Not Detected ng/mL    Cutoff: 5  42

 

 Norbuprenorphine glucuronide  Not Detected ng/mL    Cutoff: 20  43

 

 Opioid Interpretation  See Comment      44









 GC/Chlamydia Dna  2018  [facility]  Chlamydia  Negative    
Negative  



 [place] [address]  trachomatis Rna        



     [phone]          









 Neisseria gonorrhoeae (GC) Rna  Negative    Negative  









 Urine Culture And  10/11/2018  [facility]  Urine Culture  SEE 
RESULT      45



 [place] [address]    BELOW      



     [phone]          

 

 Toxoplasma Igg &  10/11/2018  [facility]  Toxoplasma IgG  Negative 
   Negative  



 [place] [address]  Antibody        



     [phone]          









 Toxoplasma IgG Antibody Index  <3 IU/mL      46

 

 Toxoplasma IgM Antibody  Negative    Negative  47









 Parvovirus B19  10/11/2018  [facility]  Parvovirus  Positive  
Abnormal  Negative  



 [place] [address]  (B19) IgG        



     [phone]  Antibody        









 Parvovirus (B19) IgM Antibody  Negative    Negative  

 

 Parvovirus Interpretation  See Comment      48









 Lead  10/11/2018  [facility]  Lead,Venous, B  < 1.0 g/dL    0.0-
4.9  49



     [place] [address]          



     [phone]          









 Venous/Capillary  Venous      

 

 Submitting Laboratory Phone  [phone]      50









 HIV 1/2 AB  10/11/2018  [facility]  HIV 1 2  Nonreactive    
Nonreactive  51



 [place] [address]  Antibody        



     [phone]          

 

 Type And  10/11/2018  [facility]  Patient  A Positive      



 [place] [address]  Blood Type        



     [phone]          









 Antibody Screen  NEGATIVE      









 CBC With No  10/11/2018  [facility]  White Blood  7.6 10^3/uL  N  
3.5-10.8  



 [place] [address]  Count        



     [phone]          









 Red Blood Count  4.37 10^6/uL  N  4.00-5.40  

 

 Hemoglobin  12.8 g/dL  N  12.0-16.0  

 

 Hematocrit  38 %  N  35-47  

 

 Mean Corpuscular Volume  88 fL  N  80-97  

 

 Mean Corpuscular Hemoglobin  29 pg  N  27-31  

 

 Mean Corpuscular HGB Conc  33 g/dL  N  31-36  

 

 Red Cell Distribution Width  14 %  N  10.5-15  

 

 Platelet Count  281 10^3/uL  N  150-450  

 

 Mean Platelet Volume  7.7 um3  N  7.4-10.4  









 Prenatal PNL No  10/11/2018  [facility]  Rubella Screen  Equivocal 
   Immune  52



 [place] [address]          



     [phone]          









 Hemoglobin A1c  5.3 %  N  4.0-5.6  53

 

 Hepatitis B Surface Ag  Nonreactive    Nonreactive  54

 

 Syphillis Igg W/Reflex RPR  Nonreactive    Nonreactive  55









 Laboratory test  10/11/2018  [facility]  Cytology  SEE RESULT BELOW
      56



 [place] [address]          



     [phone]          









 1  SEE RESULT BELOW



   -----------------------------------------------------------------------------
---------------



   Name:  MEGAN POLK           : 1990    Attend Dr: Latoya Harris MD



   Acct:  [id number]  Unit: [id number]  AGE: 28            Location:  [facility]



   Re19                        SEX: F             Status:    REG REF



   -----------------------------------------------------------------------------
---------------



   



   SPEC: [id number]         EDNA:       SUBM DR: Latoya Harris MD



   REQ:  [id number]              RECD:   



   STATUS: COMP



   _



   SOURCE: CER/VAG/RE     SPDESC:



   ORDERED:  Grp B Strp Scrn, GBS Sensi



   COMMENTS: Pt with allergies to Amoxicillin and Erythromycin



   [id number]



   QUERIES:  Is Patient Penicillin Allergic? Y



   Is patient penicillin allergic and/or sensitivities needed? Y



   Provider Requisition # [id number]



   



   -----------------------------------------------------------------------------
---------------



   Procedure                         Result                         Reported   
        Site



   -----------------------------------------------------------------------------
---------------



   Group B Strep Culture Screen  Final                              846      ML



   



   Group B Strep Screen        Positive



   



   GBS Sensitivity  Final                                           846      ML



   Organism 1                     STREP AGALACTIAE - (GROUP B)



   



   1. STREP AGALACTIAE - (GROUP B)



   M.I.C.      RX



   --------- ------



   Ampicillin                     <=0.25      S



   Penicillin                     <=0.12      S



   Clindamycin                                R



   Levofloxacin                   1           S



   Linezolid                      2           S



   * Moxifloxacin                   0.5         S



   * Quinupristin/Dalfopristin      <=0.25      S



   Tetracycline                   >=16        R



   Tigecycline                    <=0.12      S



   Vancomycin                     <=0.5       S



   Imipenem-Deduced                           S



   * Ampicillin/Sulbactam-Deduced               S



   Cefazolin-Deduced                          S



   



   



   



   



   



   ** CONTINUED ON NEXT PAGE **



   



   DEPARTMENT OF PATHOLOGY,  [address] [place] [address]



   Phone # [phone]      Fax #[phone]



   Conrado Cabrera M.D. Director     [facility] # [id number]



   



   



   



   -----------------------------------------------------------------------------
---------------



   Patient: MEGAN POLK            [id number]     (Continued)



   -----------------------------------------------------------------------------
---------------



   



   



   Specimen: [id number]    Collected:   Received: 19-
    (Continued)



   



   -----------------------------------------------------------------------------
---------------



   Procedure                         Result                         Reported   
        Site



   -----------------------------------------------------------------------------
---------------



   GBS Sensitivity  Final   (continued)



   * These antibiotics are not available in the [facility] Formulary



   



   Contact the Microbiology Department for any additional



   antibiotic reporting.



   



   -----------------------------------------------------------------------------
---------------



   * ML - Main Lab



   .



   



   



   



   



   



   



   



   



   



   



   



   



   



   



   



   



   



   



   



   



   



   



   



   



   



   



   



   



   



   ** END OF REPORT **



   



   DEPARTMENT OF PATHOLOGY,  [address], [place], [place] [address]



   Phone # [phone]      Fax #[phone]



   Conrado Cabrera M.D. Director     [facility] # [id number]

 

 2  GLU Fast 92                   Col: [phone]



   GLU 1HR  201                  Col: 19 [phone]



   GLU 2HR  181                  Col: 19 [phone]



   GLU 3HR  103                  Col: 19 1355



   GLU Interp                      Col: 19 1055



   GTT normal ranges for obstetrics per the American College of



   Gynecologists (ACOG).Based on 100 gm glucose load:



   Fasting <95 mg/dl



   1hr     <180 mg/dl



   2hr     <155 mg/dl



   3hr     <140 mg/dl

 

 3  [id number]

 

 4  [id number]

 

 5  -------------------REFERENCE VALUE--------------------------



   Cutoff: 500

 

 6  -------------------REFERENCE VALUE--------------------------



   Cutoff: 200

 

 7  -------------------REFERENCE VALUE--------------------------



   Cutoff: 100

 

 8  -------------------REFERENCE VALUE--------------------------



   Cutoff: 150

 

 9  -------------------ADDITIONAL INFORMATION-------------------



   This report is intended for use in clinical monitoring or



   management of patients.  It is not intended for use in



   employment-related testing.

 

 10  -------------------REFERENCE VALUE--------------------------



   Cutoff: 200 mg/L

 

 11  Tylenol 3

 

 12  Metabolite of codeine



   -------------------REFERENCE VALUE--------------------------



   Cutoff: 100

 

 13  Sherlyn Mckeon, MS Contin; Also a minor metabolite (10%) of



   codeine and can be seen in low concentrations (<2,000



   ng/mL) with poppy seed ingestion.

 

 14  Metabolite of morphine



   -------------------REFERENCE VALUE--------------------------



   Cutoff: 100

 

 15  Metabolite of heroin

 

 16  Lortab, Norco, Vicodin; Also a very minor metabolite of



   codeine and impurity (<1%) of oxycodone.

 

 17  Metabolite of hydrocodone

 

 18  Metabolite of hydrocodone

 

 19  Dilaudid, Exalgo; Also a metabolite of hydrocodone and a



   minor (<5%) metabolite of morphine.

 

 20  Metabolite of hydromorphone



   -------------------REFERENCE VALUE--------------------------



   Cutoff: 100

 

 21  Endocet, Percocet, Oxycontin

 

 22  Metabolite of oxycodone

 

 23  Numorphan, Opana; Also a metabolite of oxycodone.

 

 24  Metabolite of oxymorphone



   -------------------REFERENCE VALUE--------------------------



   Cutoff: 100

 

 25  Metabolite of oxymorphone

 

 26  Actiq, Duragesic, Fentora

 

 27  Metabolite of fentanyl

 

 28  Demerol

 

 29  Metabolite of meperidine

 

 30  Narcan

 

 31  Metabolite of naloxone



   -------------------REFERENCE VALUE--------------------------



   Cutoff: 100

 

 32  Dolophine

 

 33  Metabolite of methadone

 

 34  Darvon, Darvocet

 

 35  Metabolite of propoxyphene

 

 36  Tradol, Ultram, Ultracet

 

 37  Metabolite of tramadol

 

 38  Nucynta

 

 39  Metabolite of tapentadol

 

 40  Metabolite of tapentadol



   -------------------REFERENCE VALUE--------------------------



   Cutoff: 100

 

 41  Buprenex, Suboxone

 

 42  Metabolite of buprenorphine

 

 43  Metabolite of buprenorphine

 

 44  No opioids were detected. The absence of expected drug(s)



   and/or drug metabolite(s) may indicate non-compliance,



   altered pharmacokinetics, inappropriate timing of specimen



   collection relative to drug administration,



   diluted/adulterated urine, or limitations of testing.



   -------------------ADDITIONAL INFORMATION-------------------



   This test was developed and its performance characteristics



   determined by [facility] in a manner consistent with CLIA



   requirements. This test has not been cleared or approved by



   the U.S. Food and Drug Administration.



   Test Performed by:



   [facility]



   [address] [place] [address]

 

 45  SEE RESULT BELOW



   -----------------------------------------------------------------------------
---------------



   Name:  MEGAN POLK           : 1990    Attend Dr: Pattie BECKFORD



   Acct:  [id number]  Unit: [id number]  AGE: 27            Location:  [facility]



   Reg:   10/11/18                        SEX: F             Status:    REG REF



   -----------------------------------------------------------------------------
---------------



   



   SPEC: [id number]         EDNA:   10/11/    [facility] DR: Pattie BECKFORD



   REQ:  [id number]              RECD:   10/11/



   STATUS: COMP



   _



   SOURCE: URINE          SPDESC:



   ORDERED:  Urine Culture



   COMMENTS: [id number]



   Urine Source: Random



   



   -----------------------------------------------------------------------------
---------------



   Procedure                         Result                         Reported   
        Site



   -----------------------------------------------------------------------------
---------------



   Urine Culture  Final                                             10/12/18-
1333      ML



   No Growth (<1,000 CFU/mL)



   



   -----------------------------------------------------------------------------
---------------



   * ML - Main Lab



   .



   



   



   



   



   



   



   



   



   



   



   



   



   



   



   



   



   



   



   



   



   



   



   



   



   ** END OF REPORT **



   



   DEPARTMENT OF PATHOLOGY,  [address] [place] [address]



   Phone # [phone]      Fax #[phone]



   Conrado Cabrera M.D. Director     [facility] # [id number]

 

 46  -------------------REFERENCE VALUE--------------------------



   <=9 IU/mL (Negative)



   10-11 IU/mL (Equivocal)



   >=12 IU/mL (Positive)



   Test Performed by:



   [place] [address]

 

 47  No IgM antibodies to T. gondii detected. Results may be



   negative in patients with recent infection or who are



   significantly immunosuppressed.

 

 48  RESULT: Results suggest past infection.



   -------------------ADDITIONAL INFORMATION-------------------



   This test has been modified from the 's



   instructions. Its performance characteristics were



   determined by [facility] in a manner consistent with



   CLIA requirements. This test has not been cleared or



   approved by the U.S. Food and Drug Administration.



   Test Performed by:



   [place] [address]

 

 49  -------------------ADDITIONAL INFORMATION-------------------



   Testing performed by Inductively Coupled Plasma-Mass



   Spectrometry (ICP-MS).



   This test was developed and its performance characteristics



   determined by [facility] in a manner consistent with CLIA



   requirements. This test has not been cleared or approved by



   the U.S. Food and Drug Administration.

 

 50  Test Performed by:



   [facility] [address] [address]

 

 51  It is recognized that currently available assays for the



   detection of antibodies to HIV-1 and/or HIV-2 may not detect



   all infected individuals. HIV antibodies may be undetectable



   in some stages of the infection and in some clinical



   conditions. The performance of this assay has not been



   established for populations of infants or children.



   



   Assayed by Chemiluminescence Microparticle Immunoassay on



   the Siemens Advia Centaur CP. Values obtained with different



   methods or kits cannot be used interchangeably.The



   diagnostic specificity of the ADVIA Centaur 1/O/2 Enhanced



   assay in the low risk population was 99.90% (6052/6058) with



   a 95% confidence interval of 99.78 to 99.96%.

 

 52  [id number]

 

 53  Therapeutic target for the treatment of diabetes



   mellitus patients is <7% HBA1C, and in selective



   patients <6.0%.  Please refer to American Diabetes



   Association diabetic care guidelines for further



   information.

 

 54  [id number]

 

 55  Warning:  A positive result is not useful for establishing a



   diagnosis of syphilis.  In most situations, such a result



   may reflect a prior treated infection; a negative result can



   exclude a diagnosis of syphilis except for incubating or



   early primary disease.

 

 56  SEE RESULT BELOW



   -----------------------------------------------------------------------------
---------------



   Name:  MEGAN POLK           : 1990    Attend Dr: Pattie BECKFORD



   Acct:  [id number]  Unit: [id number]  AGE: 27            Location:  [facility]



   Reg:   10/11/18                        SEX: F             Status:    REG REF



   -----------------------------------------------------------------------------
---------------



   



   SPEC: [id number]            EDNA: 10/11/      SUBM DR: Pattie BECKFORD



   REQ:  [id number]             RECD: 10/11/



   STATUS: SOUT



   _



   ORDERED:  TP IMAGE ANALYS, HPV/Thin Prep



   COMMENTS: [id number]



   Negative for Intraepithelial lesion or Malignancy



   



   -----------------------------------------------------------------------------
---------------



   Date     Time Test              Result   Flag (u) Normal Range



   10/11/18 0952 @ HPV RNA         Negative          Negative



   @



   @               The high-risk HPV types detected by the assay include: 16,



   @               18, 31, 33, 35, 39, 45, 51, 52, 56, 58, 59, 66, and 68.



   -----------------------------------------------------------------------------
---------------



   



   A. Ectocervical/Endocervical



   Specimen Adequacy:



   Satisfactory of evaluation



   Transformation zone component identified



   Patient Information:



   HPV: High risk HPV RNA testing regardless of pap results.



   Actual Specimen Date:         10/11/18



   Last Menstrual Date:          18



   Spec Date if unknown:       



   Pregnant?:     Y



   



   Signed by and Reported on: __________              NORI Billy(ASCP) 10
/12/18 1517



   



   This Pap test was evaluated with the assistance of the [employer] Test Imaging 
System. Due to



   cytologic findings at the imager microscope, comprehensive manual 
rescreening by a



   Cytotechnologist may be required.



   The Pap Smear is a screening test designed to aid in the detection of 
premalignant and



   malignant conditions of the uterine cervix.  It is not a diagnostic 
procedure and should



   not be used as the sole means of detecting cervical cancer.  Both false-
positive and false-



   negative reports do occur.  Depending on your risk status, a Pap smear 
should be obtained



   and evaluated every 1-3 years.



   



   -----------------------------------------------------------------------------
---------------



   



   



   



   



   ** END OF REPORT **



   



   DEPARTMENT OF PATHOLOGY,  [address] [place] [address]



   Phone # [phone]      Fax #[phone]



   Conrado Cabrera M.D. Director     [facility] # [id number]







Procedures







 Date  Code  Description  Status

 

 2019  30728  Echography Pregnant Uterus Limited  Completed

 

 2019  28447  Fetal Non-Stress Test  Completed

 

 2019  02499  Biophysical Profile Without Non Stress Test  Completed

 

 2019  70844  Echography Pregnant Uterus Follow-Up Or Repeat  Completed

 

 2019  01173  Fetal Non-Stress Test  Completed

 

 2018  04350  Echography Pregnant Uterus Complete  Completed

 

 10/11/2018  93072  OB Ultrasound First Trimester  Completed







Encounters







 Description

 

 No Information Available







Plan of Treatment

Future Appointment(s):2019  1:00 pm - Latoya Harris MD at [facility] 10:20 am - Barbie Mims CNM at [facility]2019  9:30 am - 
Latoya Harris MD at [facility]

## 2019-04-18 LAB
BASOPHILS # BLD AUTO: 0 10^3/UL (ref 0–0.2)
EOSINOPHIL # BLD AUTO: 0 10^3/UL (ref 0–0.6)
HCT VFR BLD AUTO: 30 % (ref 33–41)
HGB BLD-MCNC: 9.6 G/DL (ref 12–16)
LYMPHOCYTES # BLD AUTO: 2.7 10^3/UL (ref 1–4.8)
MCH RBC QN AUTO: 25 PG (ref 27–31)
MCHC RBC AUTO-ENTMCNC: 32 G/DL (ref 31–36)
MCV RBC AUTO: 78 FL (ref 80–97)
MONOCYTES # BLD AUTO: 0.7 10^3/UL (ref 0–0.8)
NEUTROPHILS # BLD AUTO: 4.9 10^3/UL (ref 1.5–7.7)
NRBC # BLD AUTO: 0 10^3/UL
NRBC BLD QL AUTO: 0
PLATELET # BLD AUTO: 236 10^3/UL (ref 150–450)
RBC # BLD AUTO: 3.89 10^6 /UL (ref 3.7–4.87)
WBC # BLD AUTO: 8.3 10^3/UL (ref 3.5–10.8)

## 2019-04-18 RX ADMIN — ACETAMINOPHEN PRN MG: 325 TABLET ORAL at 20:55

## 2019-04-18 RX ADMIN — IBUPROFEN PRN MG: 600 TABLET, FILM COATED ORAL at 16:42

## 2019-04-18 RX ADMIN — SIMETHICONE CHEW TAB 80 MG SCH MG: 80 TABLET ORAL at 20:56

## 2019-04-18 RX ADMIN — SIMETHICONE CHEW TAB 80 MG SCH MG: 80 TABLET ORAL at 10:26

## 2019-04-18 RX ADMIN — IBUPROFEN PRN MG: 600 TABLET, FILM COATED ORAL at 10:26

## 2019-04-18 RX ADMIN — Medication SCH MG: at 20:56

## 2019-04-18 RX ADMIN — ACETAMINOPHEN PRN MG: 325 TABLET ORAL at 10:26

## 2019-04-18 RX ADMIN — IBUPROFEN PRN MG: 600 TABLET, FILM COATED ORAL at 04:33

## 2019-04-18 RX ADMIN — SIMETHICONE CHEW TAB 80 MG SCH MG: 80 TABLET ORAL at 14:55

## 2019-04-18 RX ADMIN — ACETAMINOPHEN PRN MG: 325 TABLET ORAL at 14:55

## 2019-04-18 RX ADMIN — DOCUSATE SODIUM SCH MG: 100 CAPSULE, LIQUID FILLED ORAL at 20:56

## 2019-04-18 RX ADMIN — SIMETHICONE CHEW TAB 80 MG SCH MG: 80 TABLET ORAL at 18:19

## 2019-04-18 RX ADMIN — DOCUSATE SODIUM SCH MG: 100 CAPSULE, LIQUID FILLED ORAL at 14:55

## 2019-04-18 RX ADMIN — DOCUSATE SODIUM SCH MG: 100 CAPSULE, LIQUID FILLED ORAL at 10:26

## 2019-04-18 NOTE — OP
DATE OF OPERATION:  19 - ROOM #117

 

DATE OF BIRTH:  [birth year]

 

SURGEON:  Latoya Harris MD.

 

ASSISTANTS:  Gasper Vargas MD and Lizz Flores CNM

 

PRE-OP DIAGNOSES:

1.  Intrauterine gestation at 39 weeks gestational age.

2.  Prior  section.

3.  A2 gestational diabetes.

4.  Breech presentation 

5.  Desires permanent sterilization.

 

POST-OP DIAGNOSES:

1.  Intrauterine gestation at 39 weeks gestational age.

2.  Prior  section.

3.  A2 gestational diabetes.

4.  Breech presentation 

5.  Desires permanent sterilization.

 

OPERATIVE PROCEDURES:  Repeat low transverse  section and bilateral 
tubal ligation.

 

ESTIMATED BLOOD LOSS:  800 mL.

 

FLUIDS:  Crystalloid.

 

SPECIMENS:  Tubes and placenta.

 

FINDINGS:  Female infant, Apgars 9 and 9, weight 7 pounds 1 ounce.  Normal-
appearing placenta, uterus, ovaries, and tubes.  There was some omental 
adhesion to the anterior peritoneum in the rectus muscles.

 

DESCRIPTION OF PROCEDURE:  After informed consent was signed, the patient was 
taken to the operating room, where she was given a spinal anesthesia that was 
found to be adequate.  SCDs were placed on her legs and a Sesay catheter was 
introduced into her bladder.  She was prepped and draped in the dorsal supine 
position with a leftward tilt.  A time-out was performed.  Then, a Pfannenstiel 
skin incision was then made with a scalpel, carried down to the underlying 
layer of fascia.  The fascia was incised on either side of the midline and the 
fascial incision was extended laterally with sharp dissection.  The inferior 
edge of the fascial incision was grasped with Kocher clamps and dissected down 
with sharp dissection. Then, the superior edge of the fascial incision was also 
grasped with Kocher clamps and dissected down with sharp dissection.  The 
rectus muscles were  in the midline and the peritoneum was entered 
with blunt dissection and extended laterally with blunt dissection.  The 
bladder blade was inserted and a transverse incision was made in the lower 
uterine segment with a scalpel.  The incision was extended superiorly and 
inferiorly with blunt pressure.  The infant's feet were delivered followed by 
the torso, rest of the body, the arms were delivered with inward rotation and 
the head was delivered in a flexed position.  The cord was milked towards the 
baby, then clamped x2 and cut after more than a minute.  The baby was handed to 
the neonatologist and cord gas was collected.  The placenta was delivered with 
fundal massage and gentle cord traction.  The uterus was then exteriorized and 
cleared of clots and debris.  The uterine incision was closed with 0 Vicryl in 
a running locked fashion with a second layer of suture imbricating the first. 
Attention was then turned to the tubes after confirmation of the desire for 
permanent sterilization.  The left tube was grasped and clamped with Nita 
clamps x2, across the distal portion of the tube.  The distal portion of the 
tube was then suture ligated x2 and the fimbriated end was removed with 
Metzenbaum scissors. Good hemostasis was noted.  The same thing was repeated on 
the patient's right side.  Good hemostasis was also noted.  The abdomen was 
irrigated and the uterus was placed back into the abdominal cavity.  The 
incision was inspected and good hemostasis was noted.  The peritoneum was then 
closed with 3-0 Vicryl in a running unlocked fashion.  Good hemostasis was 
found along the rectus muscles and the fascia was then closed with 0 Vicryl in 
a running unlocked fashion.  Three interrupted sutures with 3-0 Vicryl were 
placed in the subcuticular layer.  This was irrigated and the skin was then 
closed with 4-0 Monocryl in a running subcuticular fashion.  The incision was 
cleaned.  Mastisol and Steri-Strips were placed.  A dressing was placed and the 
patient was moved to the stretcher and taken to the recovery room in stable 
condition.

 

 [id number]/CPS #: [id number]

ALEJANDRO

## 2019-04-19 RX ADMIN — Medication SCH MG: at 08:50

## 2019-04-19 RX ADMIN — DOCUSATE SODIUM SCH MG: 100 CAPSULE, LIQUID FILLED ORAL at 13:24

## 2019-04-19 RX ADMIN — IBUPROFEN PRN MG: 600 TABLET, FILM COATED ORAL at 18:22

## 2019-04-19 RX ADMIN — SIMETHICONE CHEW TAB 80 MG SCH MG: 80 TABLET ORAL at 13:24

## 2019-04-19 RX ADMIN — SIMETHICONE CHEW TAB 80 MG SCH MG: 80 TABLET ORAL at 08:50

## 2019-04-19 RX ADMIN — ACETAMINOPHEN SCH: 325 TABLET ORAL at 20:45

## 2019-04-19 RX ADMIN — IBUPROFEN PRN MG: 600 TABLET, FILM COATED ORAL at 00:40

## 2019-04-19 RX ADMIN — IBUPROFEN PRN MG: 600 TABLET, FILM COATED ORAL at 08:51

## 2019-04-19 RX ADMIN — Medication SCH MG: at 20:32

## 2019-04-19 RX ADMIN — SIMETHICONE CHEW TAB 80 MG SCH MG: 80 TABLET ORAL at 20:33

## 2019-04-19 RX ADMIN — DOCUSATE SODIUM SCH MG: 100 CAPSULE, LIQUID FILLED ORAL at 20:32

## 2019-04-19 RX ADMIN — OXYCODONE HYDROCHLORIDE AND ACETAMINOPHEN PRN TAB: 5; 325 TABLET ORAL at 00:40

## 2019-04-19 RX ADMIN — SIMETHICONE CHEW TAB 80 MG SCH MG: 80 TABLET ORAL at 18:22

## 2019-04-19 RX ADMIN — OXYCODONE HYDROCHLORIDE AND ACETAMINOPHEN PRN TAB: 5; 325 TABLET ORAL at 14:54

## 2019-04-19 RX ADMIN — ACETAMINOPHEN SCH: 325 TABLET ORAL at 20:44

## 2019-04-19 RX ADMIN — OXYCODONE HYDROCHLORIDE AND ACETAMINOPHEN PRN TAB: 5; 325 TABLET ORAL at 20:32

## 2019-04-19 RX ADMIN — DOCUSATE SODIUM SCH MG: 100 CAPSULE, LIQUID FILLED ORAL at 08:50

## 2019-04-19 RX ADMIN — KETOROLAC TROMETHAMINE SCH: 30 INJECTION, SOLUTION INTRAMUSCULAR; INTRAVENOUS at 20:45

## 2019-04-19 NOTE — PTEDU
Patient Name: MEGAN POLK

MRN: [id number]



MEGAN POLK selected video: Never Ever Shake a Baby to view on 04/19/2019 at 5:52:57 PM 
om [id number]

## 2019-04-20 VITALS — DIASTOLIC BLOOD PRESSURE: 57 MMHG | SYSTOLIC BLOOD PRESSURE: 128 MMHG

## 2019-04-20 RX ADMIN — Medication SCH MG: at 08:47

## 2019-04-20 RX ADMIN — OXYCODONE HYDROCHLORIDE AND ACETAMINOPHEN PRN TAB: 5; 325 TABLET ORAL at 08:46

## 2019-04-20 RX ADMIN — OXYCODONE HYDROCHLORIDE AND ACETAMINOPHEN PRN TAB: 5; 325 TABLET ORAL at 03:50

## 2019-04-20 RX ADMIN — IBUPROFEN PRN MG: 600 TABLET, FILM COATED ORAL at 03:50

## 2019-04-20 RX ADMIN — DOCUSATE SODIUM SCH MG: 100 CAPSULE, LIQUID FILLED ORAL at 08:47

## 2019-04-20 RX ADMIN — IBUPROFEN PRN MG: 600 TABLET, FILM COATED ORAL at 10:37

## 2019-04-20 RX ADMIN — SIMETHICONE CHEW TAB 80 MG SCH MG: 80 TABLET ORAL at 08:47

## 2020-01-15 ENCOUNTER — HOSPITAL ENCOUNTER (EMERGENCY)
Dept: HOSPITAL 25 - UCCORT | Age: 30
Discharge: HOME | End: 2020-01-15
Payer: COMMERCIAL

## 2020-01-15 VITALS — SYSTOLIC BLOOD PRESSURE: 116 MMHG | DIASTOLIC BLOOD PRESSURE: 69 MMHG

## 2020-01-15 DIAGNOSIS — J11.1: Primary | ICD-10-CM

## 2020-01-15 DIAGNOSIS — Z91.09: ICD-10-CM

## 2020-01-15 DIAGNOSIS — Z88.1: ICD-10-CM

## 2020-01-15 DIAGNOSIS — Z88.0: ICD-10-CM

## 2020-01-15 LAB — FLUBV RNA SPEC QL NAA+PROBE: POSITIVE

## 2020-01-15 PROCEDURE — G0463 HOSPITAL OUTPT CLINIC VISIT: HCPCS

## 2020-01-15 PROCEDURE — 99211 OFF/OP EST MAY X REQ PHY/QHP: CPT

## 2020-01-15 NOTE — UC
HPI Febrile Illness





- HPI Summary


HPI Summary: 





Per RN triage:


"Pt stated that she has a nasty cough, fever and not feeling well x4 days off 

and on.  Pt states that she has been exposed to the flu"


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 


-sx started 1/12/20 and have progressed. 


-no wheezing. has EIA but no resp distress. 





- History of Current Complaint


Chief Complaint: UCGeneralIllness


Time Seen by Provider: 01/15/20 20:36


Hx Last Menstrual Period: 01/01/2020


Pain Intensity: 3





- Allergy/Home Medications


Allergies/Adverse Reactions: 


 Allergies











Allergy/AdvReac Type Severity Reaction Status Date / Time


 


amoxicillin Allergy  Rash Verified 01/15/20 20:33


 


erythromycin base Allergy  Hives Verified 01/15/20 20:33


 


pecans Allergy Severe Swelling Uncoded 01/15/20 20:33





   Of  





   Face,Lips,&  





   Throat  














PMH/Surg Hx/FS Hx/Imm Hx


Previously Healthy: Yes





- Surgical History


Surgical History: Yes


Surgery Procedure, Year, and Place: C- section 5/1/16; wisdom teeth; 

gallbladder removal





- Family History


Known Family History: Positive: Cardiac Disease - BROTHER WPW, FATHER CARDIAC 

CONCERNS, Hypertension, Diabetes, Other - NO fmh of mastitis





- Social History


Alcohol Use: Occasionally


Substance Use Type: None


Smoking Status (MU): Never Smoked Tobacco


Have You Smoked in the Last Year: No





- Immunization History


Most Recent Influenza Vaccination: fall 2018


Most Recent Pneumonia Vaccination: na





Review of Systems


All Other Systems Reviewed And Are Negative: Yes


Constitutional: Positive: Fever, Chills, Fatigue


Skin: Positive: Negative.  Negative: Rash


Eyes: Positive: Negative


ENT: Positive: Sore Throat, Nasal Discharge


Respiratory: Positive: Negative, Cough - mild.  Negative: Shortness Of Breath


Cardiovascular: Positive: Negative.  Negative: Palpitations, Chest Pain


Gastrointestinal: Positive: Negative, Nausea.  Negative: Vomiting, Diarrhea


Genitourinary: Positive: Negative


Motor: Positive: Negative


Neurovascular: Positive: Negative


Musculoskeletal: Positive: Myalgia


Neurological: Positive: Negative


Psychological: Positive: Negative


Is Patient Immunocompromised?: No





Physical Exam


Triage Information Reviewed: Yes


Appearance: Well-Nourished, Ill-Appearing - mild-mod


Vital Signs: 


 Initial Vital Signs











Temp  99.8 F   01/15/20 20:30


 


Pulse  87   01/15/20 20:30


 


Resp  16   01/15/20 20:30


 


BP  116/69   01/15/20 20:30


 


Pulse Ox  99   01/15/20 20:30











Vital Signs Reviewed: Yes


Eye Exam: Normal


ENT Exam: Normal


ENT: Positive: Pharyngeal erythema, Nasal congestion, TMs normal, Uvula 

midline.  Negative: TM bulging, TM dull, TM red, Sinus tenderness


Neck exam: Normal


Neck: Positive: Supple, Nontender, No Lymphadenopathy


Respiratory Exam: Normal


Respiratory: Positive: Lungs clear, Normal breath sounds, No respiratory 

distress, No accessory muscle use.  Negative: Crackles, Rhonchi, Stridor, 

Wheezing


Cardiovascular Exam: Normal


Cardiovascular: Positive: RRR, No Murmur


Abdominal Exam: Normal


Abdomen Description: Positive: Nontender, Soft


Musculoskeletal Exam: Normal


Neurological Exam: Normal


Psychological Exam: Normal


Skin Exam: Normal


Skin: Negative: Rashes





Course/Dx





- Course


Course Of Treatment: 





+ rapid flu. sx started 5 days ago. no tamiflu indicated


-fluids/rest


-she agrees w/ plan





- Febrile Illness


Differential Diagnoses: Viremia





- Diagnoses


Provider Diagnosis: 


 Influenza








Discharge ED





- Sign-Out/Discharge


Documenting (check all that apply): Patient Departure


All imaging exams completed and their final reports reviewed: No Studies





- Discharge Plan


Condition: Stable


Disposition: HOME


Patient Education Materials:  Influenza (DC)


Forms:  *Work Release


Referrals: 


No Primary Care Phys,NOPCP [Primary Care Provider] - 


[facility] [place] 5 Days


Additional Instructions: 


Drinks lots of fluids and rest. Tyelnol/ibuprofen for pain, fever. You should 

be out of work until you are fever and symptom free for 24 hours without the 

use of a fever reducer. 





- Billing Disposition and Condition


Condition: STABLE


Disposition: Home

## 2020-03-17 ENCOUNTER — HOSPITAL ENCOUNTER (EMERGENCY)
Dept: HOSPITAL 25 - UCCORT | Age: 30
Discharge: HOME | End: 2020-03-17
Payer: COMMERCIAL

## 2020-03-17 VITALS — SYSTOLIC BLOOD PRESSURE: 139 MMHG | DIASTOLIC BLOOD PRESSURE: 70 MMHG

## 2020-03-17 DIAGNOSIS — Z91.018: ICD-10-CM

## 2020-03-17 DIAGNOSIS — Z88.1: ICD-10-CM

## 2020-03-17 DIAGNOSIS — J03.90: Primary | ICD-10-CM

## 2020-03-17 DIAGNOSIS — Z95.0: ICD-10-CM

## 2020-03-17 DIAGNOSIS — Z88.0: ICD-10-CM

## 2020-03-17 PROCEDURE — G0463 HOSPITAL OUTPT CLINIC VISIT: HCPCS

## 2020-03-17 PROCEDURE — 87070 CULTURE OTHR SPECIMN AEROBIC: CPT

## 2020-03-17 PROCEDURE — 99212 OFFICE O/P EST SF 10 MIN: CPT

## 2020-03-17 PROCEDURE — 87651 STREP A DNA AMP PROBE: CPT

## 2020-03-17 NOTE — UC
Throat Pain/Nasal Kadeem HPI





- HPI Summary


HPI Summary: 





Per RN triage:


"Sore throat and bilateral ear pain x1 week. Denies fever. "


-she has h/o tonsil stones and strep. feels liek strep


-no fever. no SOB or dyspnea


-no rash





-h/o SSS and pacer, likely congenital she reports





- History of Current Complaint


Chief Complaint: UCGeneralIllness


Stated Complaint: SORE THROAT


Time Seen by Provider: 03/17/20 17:39


Hx Last Menstrual Period: 3/10/2020


Pain Intensity: 4





- Allergies/Home Medications


Allergies/Adverse Reactions: 


 Allergies











Allergy/AdvReac Type Severity Reaction Status Date / Time


 


amoxicillin Allergy  Rash Verified 03/17/20 16:43


 


erythromycin base Allergy  Hives Verified 03/17/20 16:43


 


pecans Allergy Severe Swelling Uncoded 03/17/20 16:43





   Of  





   Face,Lips,&  





   Throat  











Home Medications: 


 Home Medications





Epi Pen 1 inj IM ONCE PRN 11/01/13 [History Confirmed 03/17/20]


Acetaminophen [Tylenol] 325 mg PO Q4H PRN 04/15/19 [History Confirmed 03/17/20]


Cefuroxime 500 MG TAB (NF) [Ceftin 500 MG TAB (NF)] 500 mg PO BID #20 tab 03/17/ 20 [Rx]


Escitalopram * [Lexapro *] 10 mg PO DAILY 03/17/20 [History Confirmed 03/17/20]











PMH/Surg Hx/FS Hx/Imm Hx


Previously Healthy: Yes


Cardiovascular History: Pacemaker/ICD





- Surgical History


Surgical History: Yes


Surgery Procedure, Year, and Place: C- section 5/1/16; wisdom teeth; 

gallbladder removal





- Family History


Known Family History: Positive: Cardiac Disease - BROTHER WPW, FATHER CARDIAC 

CONCERNS, Hypertension, Diabetes, Other - NO fmh of mastitis





- Social History


Alcohol Use: Occasionally


Substance Use Type: None


Smoking Status (MU): Never Smoked Tobacco


Have You Smoked in the Last Year: No





- Immunization History


Most Recent Influenza Vaccination: fall 2018


Most Recent Pneumonia Vaccination: na





Review of Systems


All Other Systems Reviewed And Are Negative: Yes


Constitutional: Negative: Fever, Fatigue


Skin: Negative: Rash


Eyes: Positive: Negative


ENT: Positive: Sore Throat


Respiratory: Positive: Negative.  Negative: Shortness Of Breath, Cough


Cardiovascular: Positive: Negative.  Negative: Palpitations, Chest Pain


Gastrointestinal: Positive: Negative.  Negative: Vomiting, Diarrhea, Nausea


Motor: Positive: Negative


Neurovascular: Positive: Negative


Musculoskeletal: Positive: Negative


Neurological/Mental Status: Positive: Negative


Psychological: Positive: Negative


Is Patient Immunocompromised?: No





Physical Exam


Triage Information Reviewed: Yes


Appearance: Well-Appearing, No Pain Distress, Well-Nourished


Vital Signs: 


 Initial Vital Signs











Temp  97.3 F   03/17/20 16:43


 


Pulse  65   03/17/20 16:43


 


Resp  16   03/17/20 16:43


 


BP  139/70   03/17/20 16:43


 


Pulse Ox  98   03/17/20 16:43











Vital Signs Reviewed: Yes


Eye Exam: Normal


ENT Exam: Normal


ENT: Positive: Pharyngeal erythema - mild w/ rt stone vs exudate, TMs normal, 

Uvula midline.  Negative: TM bulging, TM dull, TM red, Tonsillar swelling, 

Sinus tenderness


Neck exam: Normal


Neck: Positive: Supple, Nontender, No Lymphadenopathy


Respiratory Exam: Normal


Respiratory: Positive: Chest non-tender, Lungs clear


Cardiovascular Exam: Normal


Cardiovascular: Positive: RRR


Abdominal Exam: Normal


Abdomen Description: Positive: Nontender, Soft


Musculoskeletal Exam: Normal


Neurological Exam: Normal


Psychological Exam: Normal


Skin Exam: Normal


Skin: Negative: Rashes





Throat Pain/Nasal Course/Dx





- Course


Course Of Treatment: 





RS neg


-send TC bc it feels like prev strep but does have tonsillitis as well


-treat w/ ceftin. has PCN allergy, aware of very low chance of cross allergic 

rxn. she will monitor


-avoid zpack bc risk of arrhythmia. 


-she is very agreeable w/ plan. good historian. reliable. 





- Differential Dx/Diagnosis


Differential Diagnosis/HQI/PQRI: Laryngitis, Pharyngitis, Tonsillitis, URI


Provider Diagnosis: 


 Tonsillitis








Discharge ED





- Sign-Out/Discharge


Documenting (check all that apply): Patient Departure


All imaging exams completed and their final reports reviewed: No Studies





- Discharge Plan


Condition: Stable


Disposition: HOME


Patient Education Materials:  Tonsillitis (ED)


Referrals: 


Carleen Fountain MD [Primary Care Provider] - 3 Days


Additional Instructions: 


Rapid strep is negative but we are doing a throat culture. You may not get a 

call with the results. 


-It is recommended that you take a probiotic daily while you are on 

antibiotics. A few common brands that you can buy over the counter are colon 

health, align and florastor. These can help prevent a colon infection called c 

diff that can be associated with antibiotic use.


-You states that you have taken a zpack in the past for strep without any 

difficulty, however it can cause arrhythmias so I prefer to use ceftin. Please 

call here for the results and you can stop the antibiotic of franklin throat culture 

comes back negative. . 


-please call your PCP for follow up guidance, especially if your symptoms change

, develop a fever or difficulty breathing





- Billing Disposition and Condition


Condition: STABLE


Disposition: Home